# Patient Record
Sex: FEMALE | Race: WHITE | Employment: FULL TIME | URBAN - METROPOLITAN AREA
[De-identification: names, ages, dates, MRNs, and addresses within clinical notes are randomized per-mention and may not be internally consistent; named-entity substitution may affect disease eponyms.]

---

## 2017-01-03 ENCOUNTER — OFFICE VISIT (OUTPATIENT)
Dept: PHYSICAL THERAPY | Facility: HOSPITAL | Age: 47
End: 2017-01-03
Attending: INTERNAL MEDICINE
Payer: COMMERCIAL

## 2017-01-03 PROCEDURE — 97140 MANUAL THERAPY 1/> REGIONS: CPT

## 2017-01-03 PROCEDURE — 97112 NEUROMUSCULAR REEDUCATION: CPT

## 2017-01-03 PROCEDURE — 97110 THERAPEUTIC EXERCISES: CPT

## 2017-01-03 NOTE — PROGRESS NOTES
Dx: Neuropathy : arm; Autonomic dysfunction; POTS; Neck/back pain        Authorized # of Visits:  3/10       Next MD visit: none scheduled  Fall Risk: standard         Precautions: n/a           Medication Changes since last visit?: No    Subjective: pt re impingement symptoms with R shoulder elevation. Shoulder impingement symptoms reduced with proper positioning of shoulder girdle. Goals:   1.  Patient will be independent with HEP to optimize gains made in PT to home and community settings and for self m

## 2017-01-05 ENCOUNTER — APPOINTMENT (OUTPATIENT)
Dept: PHYSICAL THERAPY | Facility: HOSPITAL | Age: 47
End: 2017-01-05
Attending: INTERNAL MEDICINE
Payer: COMMERCIAL

## 2017-01-10 ENCOUNTER — APPOINTMENT (OUTPATIENT)
Dept: PHYSICAL THERAPY | Facility: HOSPITAL | Age: 47
End: 2017-01-10
Attending: INTERNAL MEDICINE
Payer: COMMERCIAL

## 2017-01-12 ENCOUNTER — OFFICE VISIT (OUTPATIENT)
Dept: PHYSICAL THERAPY | Facility: HOSPITAL | Age: 47
End: 2017-01-12
Attending: INTERNAL MEDICINE
Payer: COMMERCIAL

## 2017-01-12 PROCEDURE — 97112 NEUROMUSCULAR REEDUCATION: CPT

## 2017-01-12 PROCEDURE — 97140 MANUAL THERAPY 1/> REGIONS: CPT

## 2017-01-12 PROCEDURE — 97110 THERAPEUTIC EXERCISES: CPT

## 2017-01-12 NOTE — PROGRESS NOTES
Dx: Neuropathy : arm; Autonomic dysfunction; POTS; Neck/back pain        Authorized # of Visits:  4/10       Next MD visit: none scheduled  Fall Risk: standard         Precautions: n/a           Medication Changes since last visit?: No    Subjective: Over shoulders: LAD; lateral distraction; PROM (shoulder flex, abd each dir)  B hips: LAD followed by PROM for flex/IR/ER B shoulders: LAD; lateral distraction; PROM (shoulder flex, abd each dir)  B hips: LAD followed by PROM for flex/IR/ER   Neuro Re-ed Be Act

## 2017-01-17 ENCOUNTER — APPOINTMENT (OUTPATIENT)
Dept: PHYSICAL THERAPY | Facility: HOSPITAL | Age: 47
End: 2017-01-17
Attending: INTERNAL MEDICINE
Payer: COMMERCIAL

## 2017-01-19 ENCOUNTER — APPOINTMENT (OUTPATIENT)
Dept: PHYSICAL THERAPY | Facility: HOSPITAL | Age: 47
End: 2017-01-19
Attending: INTERNAL MEDICINE
Payer: COMMERCIAL

## 2017-01-26 ENCOUNTER — TELEPHONE (OUTPATIENT)
Dept: PHYSICAL THERAPY | Facility: HOSPITAL | Age: 47
End: 2017-01-26

## 2017-01-26 ENCOUNTER — APPOINTMENT (OUTPATIENT)
Dept: PHYSICAL THERAPY | Facility: HOSPITAL | Age: 47
End: 2017-01-26
Attending: INTERNAL MEDICINE
Payer: COMMERCIAL

## 2017-01-26 NOTE — TELEPHONE ENCOUNTER
Pt was on PT scheduled. I called and PT states that she was unaware of this appt. I confirmed next PT session for 1/31/17. She will email me to confirm if she can attend this session. Pt also states that she will receive her ergonomic desk next week.

## 2017-01-31 ENCOUNTER — OFFICE VISIT (OUTPATIENT)
Dept: PHYSICAL THERAPY | Facility: HOSPITAL | Age: 47
End: 2017-01-31
Attending: INTERNAL MEDICINE
Payer: COMMERCIAL

## 2017-01-31 PROCEDURE — 97112 NEUROMUSCULAR REEDUCATION: CPT

## 2017-01-31 PROCEDURE — 97110 THERAPEUTIC EXERCISES: CPT

## 2017-01-31 NOTE — PROGRESS NOTES
Dx: Neuropathy : arm; Autonomic dysfunction; POTS; Neck/back pain        Authorized # of Visits:  5/10       Next MD visit: none scheduled  Fall Risk: standard         Precautions: n/a           Medication Changes since last visit?: No    Subjective: Pt re Serratus punch on foamroll: 10x2  - Supine: snow angels on foam roll: 10x2  - Supine: march with alt arm lift on foamroll: 10x1  - Supine on foam roll: toe taps: 10x1  - Supine on foam roll: marching: 10x1  - Supine: UE neuromobilization: wrist flex/ext wi sleep for at least 6 consecutive hours without disturbance due to symptoms. 5. Patient will be able to return to her cardiac rehab exercises at least 3 times a week without difficulty. Plan: Focus on strengthening.     Charges: TEx2,  Neuro Re-edx1

## 2017-02-07 ENCOUNTER — APPOINTMENT (OUTPATIENT)
Dept: PHYSICAL THERAPY | Facility: HOSPITAL | Age: 47
End: 2017-02-07
Payer: COMMERCIAL

## 2017-02-07 ENCOUNTER — OFFICE VISIT (OUTPATIENT)
Dept: OBGYN CLINIC | Facility: CLINIC | Age: 47
End: 2017-02-07

## 2017-02-07 VITALS — DIASTOLIC BLOOD PRESSURE: 62 MMHG | SYSTOLIC BLOOD PRESSURE: 112 MMHG

## 2017-02-07 DIAGNOSIS — Z30.431 IUD CHECK UP: Primary | ICD-10-CM

## 2017-02-07 PROCEDURE — 99213 OFFICE O/P EST LOW 20 MIN: CPT | Performed by: OBSTETRICS & GYNECOLOGY

## 2017-02-07 RX ORDER — TRETINOIN 0.025 %
CREAM (GRAM) TOPICAL
COMMUNITY
Start: 2016-10-24 | End: 2017-02-07

## 2017-02-07 RX ORDER — DEXTROAMPHETAMINE SACCHARATE, AMPHETAMINE ASPARTATE, DEXTROAMPHETAMINE SULFATE AND AMPHETAMINE SULFATE 5; 5; 5; 5 MG/1; MG/1; MG/1; MG/1
20 TABLET ORAL
COMMUNITY
End: 2017-02-07

## 2017-02-07 RX ORDER — DULOXETIN HYDROCHLORIDE 60 MG/1
120 CAPSULE, DELAYED RELEASE ORAL
COMMUNITY
End: 2017-02-20

## 2017-02-07 RX ORDER — DEXTROAMPHETAMINE SACCHARATE, AMPHETAMINE ASPARTATE MONOHYDRATE, DEXTROAMPHETAMINE SULFATE AND AMPHETAMINE SULFATE 7.5; 7.5; 7.5; 7.5 MG/1; MG/1; MG/1; MG/1
CAPSULE, EXTENDED RELEASE ORAL
Refills: 0 | COMMUNITY
Start: 2017-01-24 | End: 2019-10-03

## 2017-02-07 RX ORDER — HYDROQUINONE 40 MG/G
CREAM TOPICAL
COMMUNITY
Start: 2016-10-24 | End: 2017-02-07

## 2017-02-07 NOTE — PROGRESS NOTES
HPI:    Patient ID: Juliette Moser is a 56year old female. HPI  GYN follow-up visits menorrhagia treated with Mirena placed 3 months ago. Menstrual cycles have been monthly now and lasting 5 days and her lites. No longer heavy bleeding.   No comp Paralysis             Unstable vital signs  Epinephrine                 Comment:Other reaction(s): Other             Unstable heart rate/BP   PHYSICAL EXAM:   Physical Exam  Speculum vaginally. Cervix normal-appearing IUD string visible 2-1/2 cm length.

## 2017-02-14 ENCOUNTER — APPOINTMENT (OUTPATIENT)
Dept: PHYSICAL THERAPY | Facility: HOSPITAL | Age: 47
End: 2017-02-14
Attending: INTERNAL MEDICINE
Payer: COMMERCIAL

## 2017-02-15 ENCOUNTER — TELEPHONE (OUTPATIENT)
Dept: INTERNAL MEDICINE CLINIC | Facility: CLINIC | Age: 47
End: 2017-02-15

## 2017-02-15 NOTE — TELEPHONE ENCOUNTER
Pt stts she does not feel well and that [facility] [place] knows her health issue. Pt decline on giving more information.  Please advise

## 2017-02-16 NOTE — TELEPHONE ENCOUNTER
Patient stated has a rare neurological disorder and needs to speak to Dr. Akshat He about her medications and feelings. She believes the medications are causing her to be depressed. Did not want to speak further.   Was calling to see if she can schedule an a

## 2017-02-20 ENCOUNTER — OFFICE VISIT (OUTPATIENT)
Dept: INTERNAL MEDICINE CLINIC | Facility: CLINIC | Age: 47
End: 2017-02-20

## 2017-02-20 VITALS
RESPIRATION RATE: 16 BRPM | DIASTOLIC BLOOD PRESSURE: 69 MMHG | HEIGHT: 68 IN | SYSTOLIC BLOOD PRESSURE: 147 MMHG | HEART RATE: 109 BPM

## 2017-02-20 DIAGNOSIS — F32.A DEPRESSIVE DISORDER: Primary | ICD-10-CM

## 2017-02-20 DIAGNOSIS — G90.9 AUTONOMIC DYSFUNCTION: ICD-10-CM

## 2017-02-20 PROCEDURE — 99214 OFFICE O/P EST MOD 30 MIN: CPT | Performed by: INTERNAL MEDICINE

## 2017-02-20 PROCEDURE — 99212 OFFICE O/P EST SF 10 MIN: CPT | Performed by: INTERNAL MEDICINE

## 2017-02-20 RX ORDER — BUPROPION HYDROCHLORIDE 150 MG/1
TABLET ORAL
Refills: 3 | COMMUNITY
Start: 2017-02-07 | End: 2017-07-18

## 2017-02-20 NOTE — ASSESSMENT & PLAN NOTE
Seen by dr Elias Forbes been placed on wellbutrin ,in addition to cymbalta and clonazepam- . 5 mgs 3-4 times a day and sees a therapist once a week. Has been struggling to cope with work stressors.   Job description-environmental health specialist.

## 2017-02-20 NOTE — PATIENT INSTRUCTIONS
Problem List Items Addressed This Visit        Unprioritized    Autonomic dysfunction     Diagnosed with POTS about 2 years now,has been seen in multiple centers and has been on fluid and salt resuscitation as well as management of hypotension/dizziness an

## 2017-02-20 NOTE — PROGRESS NOTES
HPI:    Patient ID: Sharath Manzo is a 56year old female. HPI Comments: Has been having difficulty with accomodations from work. Has filed a complained with [facility] and now feels that she is being treated without adequate evaluation of her needs. Oral Cap 1 capsule po bid Disp: 20 capsule Rfl: 0   BuPROPion HCl ER, XL, 150 MG Oral Tablet 24 Hr TK 1 T PO QAM Disp:  Rfl: 3   Cholecalciferol (VITAMIN D) 1000 units Oral Tab Take by mouth.  Disp:  Rfl:    Amphetamine-Dextroamphet ER 30 MG Oral Capsule SR not currently breastfeeding. There is no weight on file to calculate BMI. PHYSICAL EXAM:   Physical Exam   Constitutional: She is oriented to person, place, and time. She appears well-developed and well-nourished.    HENT:   Right Ear: External ear norm management of hypotension/dizziness and temperature control issues. has been able to exercise and keep herself in good condition,is able to walk about 1 mile in cool temps. unable to tolerate the heat.   Has been on physical therapy for the back and neck prob

## 2017-02-20 NOTE — ASSESSMENT & PLAN NOTE
Diagnosed with POTS about 2 years now,has been seen in multiple centers and has been on fluid and salt resuscitation as well as management of hypotension/dizziness and temperature control issues. has been able to exercise and keep herself in good condition,

## 2017-02-21 ENCOUNTER — OFFICE VISIT (OUTPATIENT)
Dept: PHYSICAL THERAPY | Facility: HOSPITAL | Age: 47
End: 2017-02-21
Attending: INTERNAL MEDICINE
Payer: COMMERCIAL

## 2017-02-21 PROCEDURE — 97140 MANUAL THERAPY 1/> REGIONS: CPT

## 2017-02-21 PROCEDURE — 97110 THERAPEUTIC EXERCISES: CPT

## 2017-02-21 PROCEDURE — 97530 THERAPEUTIC ACTIVITIES: CPT

## 2017-02-21 NOTE — PROGRESS NOTES
Dx: Neuropathy : arm; Autonomic dysfunction; POTS; Neck/back pain        Authorized # of Visits:  6/10 - 3/13/2017     Next MD visit: none scheduled  Fall Risk: standard         Precautions: n/a           Medication Changes since last visit?: No    Subject MFR at B radial, ulnar and median nerves   Neuro Re-ed Be Activated technique Diaphragm & psoas  Diaphragmatic breathing  Coordinating breathing with exercises    Therapeutic Activity  X 10 minutes  - Reviewed ergonomics of desk with pictures provided by [facility]

## 2017-02-22 RX ORDER — CLINDAMYCIN HYDROCHLORIDE 300 MG/1
CAPSULE ORAL
Qty: 20 CAPSULE | Refills: 0 | Status: SHIPPED | OUTPATIENT
Start: 2017-02-22 | End: 2017-07-18

## 2017-02-28 ENCOUNTER — TELEPHONE (OUTPATIENT)
Dept: ADMINISTRATIVE | Age: 47
End: 2017-02-28

## 2017-02-28 ENCOUNTER — OFFICE VISIT (OUTPATIENT)
Dept: PHYSICAL THERAPY | Facility: HOSPITAL | Age: 47
End: 2017-02-28
Attending: INTERNAL MEDICINE
Payer: COMMERCIAL

## 2017-02-28 PROCEDURE — 97112 NEUROMUSCULAR REEDUCATION: CPT

## 2017-02-28 PROCEDURE — 97140 MANUAL THERAPY 1/> REGIONS: CPT

## 2017-02-28 PROCEDURE — 97110 THERAPEUTIC EXERCISES: CPT

## 2017-02-28 NOTE — PROGRESS NOTES
Dx: Neuropathy : arm; Autonomic dysfunction; POTS; Neck/back pain        Authorized # of Visits:  6/10 - 3/13/2017     Next MD visit: none scheduled  Fall Risk: standard         Precautions: n/a           Medication Changes since last visit?: No    Subject median n.; X 5 each, B   Manual Therapy B shoulders: LAD; lateral distraction; PROM (shoulder flex, abd each dir) x 5 minutes X 15 minutes  - Supine: cervical A/P mobilization grades II-III; FMP to improve cervical flexion  - Supine: MFR at B radial, ulnar

## 2017-02-28 NOTE — TELEPHONE ENCOUNTER
Dr. Newsome Friend,  Good afternoon,  This patient came into [facility] asking for assistance with a work accommodation. It looks like you provided a very detailed letter about a year ago. Is her diagnosis permanent?    She did inform me her neurologist will not fill th

## 2017-03-01 NOTE — TELEPHONE ENCOUNTER
Discussed-  Diagnosis of POTS -PERMANENT-and is always prone to autonomic dysfunction with lightheadedness and temp fluctuation intolerance as well as depression.   Will need to work at home most times but pt has been stable on current regimen of fluid resu

## 2017-03-09 ENCOUNTER — PATIENT MESSAGE (OUTPATIENT)
Dept: INTERNAL MEDICINE CLINIC | Facility: CLINIC | Age: 47
End: 2017-03-09

## 2017-03-13 NOTE — TELEPHONE ENCOUNTER
From: Olga Sheth  To: Cristiano Aponte MD  Sent: 3/9/2017 3:24 PM CST  Subject: Visit Follow-up Question    Dr Noel Gordon. I dropped off an envelope with [employer]'s medical questions and my thoughts on their questions.  Can you please help me with this letter

## 2017-03-14 ENCOUNTER — OFFICE VISIT (OUTPATIENT)
Dept: PHYSICAL THERAPY | Facility: HOSPITAL | Age: 47
End: 2017-03-14
Attending: INTERNAL MEDICINE
Payer: COMMERCIAL

## 2017-03-14 ENCOUNTER — TELEPHONE (OUTPATIENT)
Dept: INTERNAL MEDICINE CLINIC | Facility: CLINIC | Age: 47
End: 2017-03-14

## 2017-03-14 PROCEDURE — 97110 THERAPEUTIC EXERCISES: CPT

## 2017-03-14 NOTE — PROGRESS NOTES
Dx: Neuropathy : arm; Autonomic dysfunction; POTS; Neck/back pain        Authorized # of Visits:  8/10 - 3/13/2017     Next MD visit: none scheduled  Fall Risk: standard         Precautions: n/a           Medication Changes since last visit?: No     Physic at 5/5 MMT grade, except for C1 (cervical flexors) at 4+/5.  She demonstrates (+) adverse neural tension for median, ulnar and radial nerves; however, has shown steady increase in nuerodynamics with increased ROM noted with ULNTT tests with subsequent PT vi breathing   Manual Therapy X 15 minutes  - Supine: cervical A/P mobilization grades II-III; FMP to improve cervical flexion  - Supine: MFR at B radial, ulnar and median nerves    Neuro Re-ed X 8 minutes  - Diaphragmatic breathing  - Manual cueing for neuro

## 2017-03-14 NOTE — TELEPHONE ENCOUNTER
Pt stts she is going through a tough time and started crying. Pt stts she has a clinical issue.  Please advise

## 2017-03-14 NOTE — TELEPHONE ENCOUNTER
Reason for Call/Chief Complaint: Patient upset in regards to a letter that she needs to have filled out (please see my chart encounters)   Onset:   Nursing Assessment/Associated Symptoms: Patient is calling upset in regards to needing a letter filled out s

## 2017-03-15 NOTE — TELEPHONE ENCOUNTER
Dr. Yuly Beebe,     Pt called back stating that she needs to speak to you by phone or in an office visit. She was crying during our phone conversation due to the need for her letter in regard to work.  I noted many communications in her medical record in regard

## 2017-03-16 NOTE — TELEPHONE ENCOUNTER
Pt is calling back and is very upset ,still waiting for the DR to call her . Patient spoke to nurse yesterday ,  and said she was crying and needs to speak to her DR or anyone that can help her ,  [phone]. Pt is crying on the phone.

## 2017-03-17 NOTE — TELEPHONE ENCOUNTER
Dr Mercedes Gillis, please advise. Pt says that she needs to speak with ARMANDO she would like to be called at [phone]. Pt also asking if ARMANDO will read her [employer] messages before calling. Pt is very concerned about losing her job.   She says her agency's budget

## 2017-03-17 NOTE — TELEPHONE ENCOUNTER
Pt continues to call demanding to speak to ARMANDO. Wants to make sure that ARMANDO reads [employer] messages before calling.

## 2017-03-28 ENCOUNTER — APPOINTMENT (OUTPATIENT)
Dept: PHYSICAL THERAPY | Facility: HOSPITAL | Age: 47
End: 2017-03-28
Payer: COMMERCIAL

## 2017-04-04 ENCOUNTER — TELEPHONE (OUTPATIENT)
Dept: INTERNAL MEDICINE CLINIC | Facility: CLINIC | Age: 47
End: 2017-04-04

## 2017-04-06 NOTE — TELEPHONE ENCOUNTER
PA for Tretinoin 0.025% cream completed with [facility] via CMM response time 24-72 hours KEY [id number].

## 2017-04-11 ENCOUNTER — APPOINTMENT (OUTPATIENT)
Dept: PHYSICAL THERAPY | Facility: HOSPITAL | Age: 47
End: 2017-04-11
Payer: COMMERCIAL

## 2017-04-25 ENCOUNTER — APPOINTMENT (OUTPATIENT)
Dept: PHYSICAL THERAPY | Facility: HOSPITAL | Age: 47
End: 2017-04-25
Payer: COMMERCIAL

## 2017-07-13 ENCOUNTER — PATIENT MESSAGE (OUTPATIENT)
Dept: INTERNAL MEDICINE CLINIC | Facility: CLINIC | Age: 47
End: 2017-07-13

## 2017-07-16 ENCOUNTER — TELEPHONE (OUTPATIENT)
Dept: INTERNAL MEDICINE CLINIC | Facility: CLINIC | Age: 47
End: 2017-07-16

## 2017-07-16 NOTE — TELEPHONE ENCOUNTER
To: Siva Letters      From: Amie Roth RN      Created: 7/16/2017 3:35 PM        Hi Judi Lucas, Please call the office Monday am and ask to speak to a nurse so we can assist with getting you in with Dr. Lianet Meehan.      Patrice Boswell RN  [phone]

## 2017-07-16 NOTE — TELEPHONE ENCOUNTER
From: Angelina Gonzales  To: Farhan Jesus MD  Sent: 7/13/2017 12:24 PM CDT  Subject: Visit Follow-up Question    Hi Dr Festus Spence. I need to see you. I'm struggling with the heat and extreme fatigue.  Can you please see if you can make time in your schedule

## 2017-07-17 NOTE — TELEPHONE ENCOUNTER
Spoke to patient, she states she would like to see Dr Lianet Meehan - states \"she feels off\" and Dr Lianet Meehan would know what that means. Pt states she has felt off for 2-3 weeks, worsening in the last few days.  States one week ago she woke up and was so dehyd

## 2017-07-18 ENCOUNTER — OFFICE VISIT (OUTPATIENT)
Dept: INTERNAL MEDICINE CLINIC | Facility: CLINIC | Age: 47
End: 2017-07-18

## 2017-07-18 VITALS
SYSTOLIC BLOOD PRESSURE: 122 MMHG | BODY MASS INDEX: 22.73 KG/M2 | HEART RATE: 99 BPM | DIASTOLIC BLOOD PRESSURE: 79 MMHG | HEIGHT: 68 IN | WEIGHT: 150 LBS

## 2017-07-18 DIAGNOSIS — R53.82 CHRONIC FATIGUE: ICD-10-CM

## 2017-07-18 DIAGNOSIS — L70.5 EXCORIATED ACNE: ICD-10-CM

## 2017-07-18 DIAGNOSIS — I49.8 POTS (POSTURAL ORTHOSTATIC TACHYCARDIA SYNDROME): Primary | ICD-10-CM

## 2017-07-18 PROCEDURE — 99214 OFFICE O/P EST MOD 30 MIN: CPT | Performed by: INTERNAL MEDICINE

## 2017-07-18 PROCEDURE — 99212 OFFICE O/P EST SF 10 MIN: CPT | Performed by: INTERNAL MEDICINE

## 2017-07-18 RX ORDER — CHLORHEXIDINE GLUCONATE 4 G/100ML
30 SOLUTION TOPICAL
Qty: 473 ML | Refills: 0 | Status: SHIPPED | OUTPATIENT
Start: 2017-07-18 | End: 2018-02-08

## 2017-07-18 RX ORDER — CLINDAMYCIN AND BENZOYL PEROXIDE 10; 50 MG/G; MG/G
GEL TOPICAL DAILY
COMMUNITY
End: 2021-01-01

## 2017-07-18 RX ORDER — B3/AZEL/QUER/TUR/FA/B6/ZN/COPP 700 MG-500
1 TABLET ORAL 2 TIMES DAILY
COMMUNITY
End: 2019-10-03

## 2017-07-18 RX ORDER — DOXYCYCLINE HYCLATE 100 MG/1
100 CAPSULE ORAL 2 TIMES DAILY
COMMUNITY
End: 2017-10-26 | Stop reason: ALTCHOICE

## 2017-07-18 RX ORDER — FLUCONAZOLE 150 MG/1
150 TABLET ORAL ONCE
COMMUNITY
End: 2017-10-26

## 2017-07-18 RX ORDER — BUPROPION HYDROCHLORIDE 300 MG/1
300 TABLET ORAL DAILY
COMMUNITY
End: 2021-01-01

## 2017-07-19 NOTE — ASSESSMENT & PLAN NOTE
Gradually worsening fatigue, no recent fevers or chills. No focus of infection. Mild redness in the throat without any postnasal drip that is obvious. No sinus infection. Patient has been on doxycycline for management of worsening acne.   Advised to dri

## 2017-07-19 NOTE — PATIENT INSTRUCTIONS
Problem List Items Addressed This Visit        Unprioritized    Chronic fatigue     Gradually worsening fatigue, no recent fevers or chills. No focus of infection. Mild redness in the throat without any postnasal drip that is obvious.   No sinus infection Yes

## 2017-07-19 NOTE — PROGRESS NOTES
HPI:    Patient ID: Dannielle Mistry is a 56year old female. Per dr Philomena Grier from [address] [place] [address]    IMP: Symptoms have clearly improved with non--pharmacological interventions.  She has been dealing with numerous major stressors in her life includin Positive for fatigue. HENT: Negative. Eyes: Negative. Respiratory: Negative. Cardiovascular: Negative. Gastrointestinal: Negative. Endocrine: Negative. Genitourinary: Negative. Musculoskeletal: Positive for arthralgias and myalgias. OR) Take  by mouth. Disp:  Rfl:    ClonazePAM (KLONOPIN) 0.5 MG Oral Tab Take 0.5 mg by mouth 2 (two) times daily as needed for Anxiety (and a third pill as needed).  Disp:  Rfl:    Elastic Bandages & Supports (TRUFORM STOCKINGS 10-20MMHG) Does not apply Mi Musculoskeletal: Normal range of motion. She exhibits no edema or tenderness. Lymphadenopathy:     She has no cervical adenopathy. Neurological: She is alert and oriented to person, place, and time. She has normal reflexes. No cranial nerve deficit. (postural orthostatic tachycardia syndrome) - Primary      Other Visit Diagnoses    None. Return in about 2 weeks (around 8/1/2017).   PT UNDERSTANDS AND AGREES TO FOLLOW DIRECTIONS AND ADVICE      Orders Placed This Encounter      CBC W Differential

## 2017-08-01 ENCOUNTER — TELEPHONE (OUTPATIENT)
Dept: INTERNAL MEDICINE CLINIC | Facility: CLINIC | Age: 47
End: 2017-08-01

## 2017-08-01 NOTE — TELEPHONE ENCOUNTER
Patient calling to cancel today's office visit. Pt states she has been under a lot of stress lately from work and feels particularly bad today.  Pt states she slept in until 10 which is very unlike her, heart rate and blood pressure more elevated than usual

## 2017-08-03 NOTE — TELEPHONE ENCOUNTER
Miles Blackwell I called pt to  Find out how she is dong. She stated that she feel much better but she still feels a little fatigue. She stated that she will have her blood work done tomorrow and will see you next week.  I gave her a appt to see you on 8/9 at

## 2017-08-04 ENCOUNTER — TELEPHONE (OUTPATIENT)
Dept: OBGYN CLINIC | Facility: CLINIC | Age: 47
End: 2017-08-04

## 2017-08-04 NOTE — TELEPHONE ENCOUNTER
Pt wants IUD removed because the IUD is causing her to have acne and her menstrual periods are 10-14 days in length. Pt will be on her menses next week. Pt scheduled for 8/15 at 2:50 pm for iud removal and to discuss other BC options.

## 2017-08-07 ENCOUNTER — LAB ENCOUNTER (OUTPATIENT)
Dept: LAB | Facility: HOSPITAL | Age: 47
End: 2017-08-07
Attending: INTERNAL MEDICINE
Payer: COMMERCIAL

## 2017-08-07 DIAGNOSIS — R53.82 CHRONIC FATIGUE: ICD-10-CM

## 2017-08-07 LAB
ALBUMIN SERPL BCP-MCNC: 4.4 G/DL (ref 3.5–4.8)
ALBUMIN/GLOB SERPL: 1.6 {RATIO} (ref 1–2)
ALP SERPL-CCNC: 32 U/L (ref 32–100)
ALT SERPL-CCNC: 26 U/L (ref 14–54)
ANION GAP SERPL CALC-SCNC: 6 MMOL/L (ref 0–18)
AST SERPL-CCNC: 23 U/L (ref 15–41)
BASOPHILS # BLD: 0 K/UL (ref 0–0.2)
BASOPHILS NFR BLD: 0 %
BILIRUB SERPL-MCNC: 0.6 MG/DL (ref 0.3–1.2)
BUN SERPL-MCNC: 13 MG/DL (ref 8–20)
BUN/CREAT SERPL: 12.5 (ref 10–20)
CALCIUM SERPL-MCNC: 9.3 MG/DL (ref 8.5–10.5)
CHLORIDE SERPL-SCNC: 104 MMOL/L (ref 95–110)
CO2 SERPL-SCNC: 28 MMOL/L (ref 22–32)
CREAT SERPL-MCNC: 1.04 MG/DL (ref 0.5–1.5)
CRP SERPL-MCNC: <0.5 MG/DL (ref 0–0.9)
EOSINOPHIL # BLD: 0.1 K/UL (ref 0–0.7)
EOSINOPHIL NFR BLD: 2 %
ERYTHROCYTE [DISTWIDTH] IN BLOOD BY AUTOMATED COUNT: 12.8 % (ref 11–15)
ERYTHROCYTE [SEDIMENTATION RATE] IN BLOOD: 5 MM/HR (ref 0–20)
GLOBULIN PLAS-MCNC: 2.7 G/DL (ref 2.5–3.7)
GLUCOSE SERPL-MCNC: 109 MG/DL (ref 70–99)
HCT VFR BLD AUTO: 44.6 % (ref 35–48)
HGB BLD-MCNC: 15.4 G/DL (ref 12–16)
LYMPHOCYTES # BLD: 1.7 K/UL (ref 1–4)
LYMPHOCYTES NFR BLD: 27 %
MCH RBC QN AUTO: 32.2 PG (ref 27–32)
MCHC RBC AUTO-ENTMCNC: 34.4 G/DL (ref 32–37)
MCV RBC AUTO: 93.4 FL (ref 80–100)
MONOCYTES # BLD: 0.4 K/UL (ref 0–1)
MONOCYTES NFR BLD: 6 %
NEUTROPHILS # BLD AUTO: 4 K/UL (ref 1.8–7.7)
NEUTROPHILS NFR BLD: 65 %
OSMOLALITY UR CALC.SUM OF ELEC: 287 MOSM/KG (ref 275–295)
PLATELET # BLD AUTO: 191 K/UL (ref 140–400)
PMV BLD AUTO: 9.6 FL (ref 7.4–10.3)
POTASSIUM SERPL-SCNC: 4.7 MMOL/L (ref 3.3–5.1)
PROT SERPL-MCNC: 7.1 G/DL (ref 5.9–8.4)
RBC # BLD AUTO: 4.78 M/UL (ref 3.7–5.4)
SODIUM SERPL-SCNC: 138 MMOL/L (ref 136–144)
T3FREE SERPL-MCNC: 3.15 PG/ML (ref 2.53–4.29)
T4 FREE SERPL-MCNC: 0.87 NG/DL (ref 0.58–1.64)
TSH SERPL-ACNC: 1.44 UIU/ML (ref 0.45–5.33)
VIT B12 SERPL-MCNC: 897 PG/ML (ref 181–914)
WBC # BLD AUTO: 6.2 K/UL (ref 4–11)

## 2017-08-07 PROCEDURE — 85025 COMPLETE CBC W/AUTO DIFF WBC: CPT

## 2017-08-07 PROCEDURE — 82607 VITAMIN B-12: CPT

## 2017-08-07 PROCEDURE — 80053 COMPREHEN METABOLIC PANEL: CPT

## 2017-08-07 PROCEDURE — 82306 VITAMIN D 25 HYDROXY: CPT

## 2017-08-07 PROCEDURE — 84439 ASSAY OF FREE THYROXINE: CPT

## 2017-08-07 PROCEDURE — 84481 FREE ASSAY (FT-3): CPT

## 2017-08-07 PROCEDURE — 84443 ASSAY THYROID STIM HORMONE: CPT

## 2017-08-07 PROCEDURE — 85652 RBC SED RATE AUTOMATED: CPT

## 2017-08-07 PROCEDURE — 86140 C-REACTIVE PROTEIN: CPT

## 2017-08-07 PROCEDURE — 36415 COLL VENOUS BLD VENIPUNCTURE: CPT

## 2017-08-08 LAB — 25(OH)D3 SERPL-MCNC: 45.9 NG/ML

## 2017-08-09 ENCOUNTER — OFFICE VISIT (OUTPATIENT)
Dept: INTERNAL MEDICINE CLINIC | Facility: CLINIC | Age: 47
End: 2017-08-09

## 2017-08-09 VITALS
RESPIRATION RATE: 18 BRPM | WEIGHT: 150.13 LBS | HEART RATE: 123 BPM | BODY MASS INDEX: 22.75 KG/M2 | SYSTOLIC BLOOD PRESSURE: 146 MMHG | DIASTOLIC BLOOD PRESSURE: 75 MMHG | OXYGEN SATURATION: 98 % | TEMPERATURE: 99 F | HEIGHT: 68 IN

## 2017-08-09 DIAGNOSIS — R53.82 CHRONIC FATIGUE: ICD-10-CM

## 2017-08-09 DIAGNOSIS — I49.8 POTS (POSTURAL ORTHOSTATIC TACHYCARDIA SYNDROME): ICD-10-CM

## 2017-08-09 DIAGNOSIS — N92.0 MENORRHAGIA WITH REGULAR CYCLE: Primary | ICD-10-CM

## 2017-08-09 PROCEDURE — 99212 OFFICE O/P EST SF 10 MIN: CPT | Performed by: INTERNAL MEDICINE

## 2017-08-09 PROCEDURE — 99214 OFFICE O/P EST MOD 30 MIN: CPT | Performed by: INTERNAL MEDICINE

## 2017-08-09 NOTE — PATIENT INSTRUCTIONS
Problem List Items Addressed This Visit        Unprioritized    Chronic fatigue     Chronic fatigue most likely related to pots, volume deficits, depressive disorder. No significant abnormalities and labs at this time.   Advised to continue to drink fluids

## 2017-08-09 NOTE — ASSESSMENT & PLAN NOTE
Intermittent and irregular bleeding that continues throughout the cycle. She does have an IUD in place which seems to have worsened her acne in addition. Planning to have this taken out an alternate form of birth control at that time.   Will follow-up aft

## 2017-08-09 NOTE — PROGRESS NOTES
HPI:    Patient ID: Braulio Clemente is a 56year old female. All labs completed look normal.        Tired   This is a chronic problem. The current episode started more than 1 year ago. The problem occurs constantly.  Progression since onset: multifac Hr TK ONE C PO QAM Disp:  Rfl: 0   hydrocortisone 2.5 % Rectal Cream Apply as directed Disp: 30 g Rfl: 2   Fluticasone Propionate 50 MCG/ACT Nasal Suspension 1 PUFF EACH NOSTRIL 2 TIMES A  DAY Disp: 1 Bottle Rfl: 3   tretinoin 0.025 % External Cream Extern EOM are normal. Pupils are equal, round, and reactive to light. Right eye exhibits no discharge. Left eye exhibits no discharge. Neck: Normal range of motion. Neck supple. No JVD present. No thyromegaly present.    Cardiovascular: Normal rate, regular rhy fluctuations, temperature regulation issues, lightheadedness and presyncope. Supportive treatment has been discussed. Adequate fluid for hydration discussed. She has been seen by psychiatry as well as counseling for management of depression.   Will monit

## 2017-08-09 NOTE — ASSESSMENT & PLAN NOTE
Chronic fatigue most likely related to pots, volume deficits, depressive disorder. No significant abnormalities and labs at this time. Advised to continue to drink fluids, exercise on a regular basis eat frequently.   Discussed with [facility]–Dr. POSEY

## 2017-08-09 NOTE — ASSESSMENT & PLAN NOTE
Ongoing issues with [place] related hypotension, mood fluctuations, temperature regulation issues, lightheadedness and presyncope. Supportive treatment has been discussed. Adequate fluid for hydration discussed.   She has been seen by psychiatry as well as

## 2017-08-10 ENCOUNTER — TELEPHONE (OUTPATIENT)
Dept: INTERNAL MEDICINE CLINIC | Facility: CLINIC | Age: 47
End: 2017-08-10

## 2017-08-10 ENCOUNTER — PATIENT MESSAGE (OUTPATIENT)
Dept: INTERNAL MEDICINE CLINIC | Facility: CLINIC | Age: 47
End: 2017-08-10

## 2017-08-15 ENCOUNTER — OFFICE VISIT (OUTPATIENT)
Dept: OBGYN CLINIC | Facility: CLINIC | Age: 47
End: 2017-08-15

## 2017-08-15 ENCOUNTER — TELEPHONE (OUTPATIENT)
Dept: INTERNAL MEDICINE CLINIC | Facility: CLINIC | Age: 47
End: 2017-08-15

## 2017-08-15 VITALS
BODY MASS INDEX: 23 KG/M2 | WEIGHT: 148.63 LBS | DIASTOLIC BLOOD PRESSURE: 80 MMHG | SYSTOLIC BLOOD PRESSURE: 113 MMHG | HEART RATE: 116 BPM

## 2017-08-15 DIAGNOSIS — Z30.432 ENCOUNTER FOR IUD REMOVAL: Primary | ICD-10-CM

## 2017-08-15 LAB
CONTROL LINE PRESENT WITH A CLEAR BACKGROUND (YES/NO): YES YES/NO
KIT LOT #: NORMAL NUMERIC
PREGNANCY TEST, URINE: NEGATIVE

## 2017-08-15 PROCEDURE — 81025 URINE PREGNANCY TEST: CPT | Performed by: OBSTETRICS & GYNECOLOGY

## 2017-08-15 PROCEDURE — 58301 REMOVE INTRAUTERINE DEVICE: CPT | Performed by: OBSTETRICS & GYNECOLOGY

## 2017-08-15 PROCEDURE — 99213 OFFICE O/P EST LOW 20 MIN: CPT | Performed by: OBSTETRICS & GYNECOLOGY

## 2017-08-15 RX ORDER — NORGESTIMATE AND ETHINYL ESTRADIOL 0.25-0.035
1 KIT ORAL DAILY
Qty: 1 PACKAGE | Refills: 2 | Status: SHIPPED | OUTPATIENT
Start: 2017-08-15 | End: 2017-11-04

## 2017-08-15 NOTE — TELEPHONE ENCOUNTER
Dr Yuly Beebe, please advise. Pt says she just had some blood work released on Stewart. She has questions about her glucose level. It is slightly elevated. Pt says this is a trend that has happened over the last several times she had labs.   Pt wants ARMANDO to r

## 2017-08-15 NOTE — TELEPHONE ENCOUNTER
From: Laurie Platt  To: Roc Ivy MD  Sent: 8/10/2017 12:35 PM CDT  Subject: Test Results Question    Can you please ask Dr. Camacho Gardner to finalize my bloodwork results? We went over the results yesterday, during my follow-up visit.  Thank you, Iraida Bradford

## 2017-08-15 NOTE — PROGRESS NOTES
HPI:    Patient ID: Janie Torres is a 56year old female. HPI  GYN consultation  Requesting IUD removal of Mirena. States menses have been lasting \"too long\" gets menstrual cycles regularly every month and last up to 10 days.   Says they are no amphetamine-dextroamphetamine 10 MG Oral Tab Take 10 mg by mouth daily. Disp:  Rfl: 0   DULoxetine HCl 60 MG Oral Cap DR Particles Take 120 mg by mouth daily. Disp:  Rfl: 0   Cyclobenzaprine HCl 10 MG Oral Tab Take 10 mg by mouth as needed.    Disp: Paternal Aunt      Lung cancer (smoker); per NG   • Cancer Paternal Aunt      Lung cancer (smoker); per NG   • Cancer Paternal Uncle      Lung cancer (Non-smoker); per NG      Social History: Smoking status: Never Smoker

## 2017-08-15 NOTE — PROCEDURES
In lithotomy position, speculum placed vaginally and cervix exposed. Cervix cleaned. IUD string grasped with ring forceps and Mirena iud removed intact. Tolerated well.

## 2017-08-30 ENCOUNTER — TELEPHONE (OUTPATIENT)
Dept: OTHER | Age: 47
End: 2017-08-30

## 2017-08-30 NOTE — TELEPHONE ENCOUNTER
Please advise. Thank you. Pt calling with questions/concerns about a couple of her results. Pt states she called a couple of weeks ago, bur no encounter was found. Pt states that her glucose is 109 and her GFR is 57.  Pt states that since she has a ra

## 2017-08-31 NOTE — TELEPHONE ENCOUNTER
She was seen has an office visit. The blood test done nonfasting there is no need to check any hemoglobin A1c at this time. We will recheck that if that remains a concern later on.   What she has is - pots- and it has nothing to do with her blood sugars b

## 2017-09-01 NOTE — TELEPHONE ENCOUNTER
Pt returned call. Reviewed doctor's test results information as noted below. Pt also was asking about reason for lower GFR. Pt states doctor was going to write a letter for her employer requesting reasonable accommodations be made for pt.     Please advi

## 2017-10-09 ENCOUNTER — TELEPHONE (OUTPATIENT)
Dept: INTERNAL MEDICINE CLINIC | Facility: CLINIC | Age: 47
End: 2017-10-09

## 2017-10-09 NOTE — TELEPHONE ENCOUNTER
Please see pt's [id number] message. When asking pt for more information on what type of letter is needed pt got upset. \" Also, please ask her to complete my letter for work. \"

## 2017-10-09 NOTE — TELEPHONE ENCOUNTER
Please see pt's mychart message:    Please forward my request to  Dr Clyde Mariee. I have a rare disorder and she wanted to see me much, much earlier      Pt is scheduled for an px on 12/8, first available appointment.      Pt is asking to be seen sooner, can pt

## 2017-10-23 ENCOUNTER — HOSPITAL ENCOUNTER (OUTPATIENT)
Age: 47
Discharge: HOME | End: 2017-10-23
Payer: COMMERCIAL

## 2017-10-23 VITALS
WEIGHT: 148 LBS | SYSTOLIC BLOOD PRESSURE: 126 MMHG | OXYGEN SATURATION: 100 % | BODY MASS INDEX: 22.43 KG/M2 | HEART RATE: 112 BPM | RESPIRATION RATE: 18 BRPM | HEIGHT: 68 IN | TEMPERATURE: 98 F | DIASTOLIC BLOOD PRESSURE: 86 MMHG

## 2017-10-23 DIAGNOSIS — R11.0 NAUSEA: Primary | ICD-10-CM

## 2017-10-23 DIAGNOSIS — H57.89 IRRITATION OF RIGHT EYE: ICD-10-CM

## 2017-10-23 PROCEDURE — 99214 OFFICE O/P EST MOD 30 MIN: CPT

## 2017-10-23 PROCEDURE — 99213 OFFICE O/P EST LOW 20 MIN: CPT

## 2017-10-23 RX ORDER — ONDANSETRON 4 MG/1
4 TABLET, ORALLY DISINTEGRATING ORAL EVERY 4 HOURS PRN
Qty: 20 TABLET | Refills: 0 | Status: SHIPPED | OUTPATIENT
Start: 2017-10-23 | End: 2017-10-26

## 2017-10-23 RX ORDER — ONDANSETRON 4 MG/1
4 TABLET, ORALLY DISINTEGRATING ORAL EVERY 4 HOURS PRN
Qty: 20 TABLET | Refills: 0 | Status: SHIPPED | OUTPATIENT
Start: 2017-10-23 | End: 2017-10-23

## 2017-10-23 RX ORDER — PYRIDOSTIGMINE BROMIDE 60 MG/1
60 TABLET ORAL 3 TIMES DAILY
COMMUNITY
End: 2019-02-21

## 2017-10-23 NOTE — ED PROVIDER NOTES
Patient Seen in: [facility]    History   Patient presents with:   Eye Visual Problem (opthalmic)  Nausea    Stated Complaint: righ eye pain/nausea    HPI    Patient is a 67-year-old female with a significant medical histor Smokeless tobacco: Never Used                      Alcohol use: Yes           0.0 oz/week     Comment: 1-2 beers/week      Review of Systems   Constitutional: Negative. HENT: Negative.     Eyes: Positive for pa Cardiovascular: Normal rate, regular rhythm, normal heart sounds and intact distal pulses.     Pulmonary/Chest: Breath sounds normal.           ED Course   Labs Reviewed - No data to display    MDM   Patient presented with mild tachycardia for which she s

## 2017-10-23 NOTE — ED INITIAL ASSESSMENT (HPI)
REPORTS RIGHT EYE PAIN X 2 DAYS, STATES SHE ALSO HAS A FOREIGN BODY SENSATION TO HER RIGHT EYE. PATIENT REPORTS A HISTORY OF NERVOUS SYSTEM DISORDER THAT IS CAUSING HER NAUSEA.   PATIENT TAKING OTC NAUZENE, REQUESTING PRESCRIPTION OTC MEDICATION

## 2017-10-24 NOTE — TELEPHONE ENCOUNTER
[address] [place] [address] pt calling us for a update to her message below. She stated that she needs to see you soon this week. She stated that she has a rare disorder and you are aware of.  She will see you on Thursday at 7 pm but she also wanted me to inform you that she

## 2017-10-26 ENCOUNTER — OFFICE VISIT (OUTPATIENT)
Dept: INTERNAL MEDICINE CLINIC | Facility: CLINIC | Age: 47
End: 2017-10-26

## 2017-10-26 VITALS
SYSTOLIC BLOOD PRESSURE: 116 MMHG | HEIGHT: 68 IN | TEMPERATURE: 99 F | BODY MASS INDEX: 22.76 KG/M2 | RESPIRATION RATE: 18 BRPM | HEART RATE: 98 BPM | WEIGHT: 150.19 LBS | DIASTOLIC BLOOD PRESSURE: 78 MMHG

## 2017-10-26 DIAGNOSIS — E78.2 MIXED HYPERLIPIDEMIA: Primary | ICD-10-CM

## 2017-10-26 DIAGNOSIS — G90.9 AUTONOMIC DYSFUNCTION: ICD-10-CM

## 2017-10-26 DIAGNOSIS — F98.8 ATTENTION DEFICIT DISORDER, UNSPECIFIED HYPERACTIVITY PRESENCE: ICD-10-CM

## 2017-10-26 DIAGNOSIS — F32.A DEPRESSIVE DISORDER: ICD-10-CM

## 2017-10-26 PROCEDURE — 90686 IIV4 VACC NO PRSV 0.5 ML IM: CPT | Performed by: INTERNAL MEDICINE

## 2017-10-26 PROCEDURE — 99212 OFFICE O/P EST SF 10 MIN: CPT | Performed by: INTERNAL MEDICINE

## 2017-10-26 PROCEDURE — 99214 OFFICE O/P EST MOD 30 MIN: CPT | Performed by: INTERNAL MEDICINE

## 2017-10-26 PROCEDURE — 90471 IMMUNIZATION ADMIN: CPT | Performed by: INTERNAL MEDICINE

## 2017-10-26 RX ORDER — NORGESTIMATE AND ETHINYL ESTRADIOL 0.25-0.035
KIT ORAL
COMMUNITY
Start: 2017-09-05 | End: 2017-10-26

## 2017-10-26 RX ORDER — TRETINOIN 0.025 %
CREAM (GRAM) TOPICAL
Qty: 45 G | Refills: 6 | Status: SHIPPED | OUTPATIENT
Start: 2017-10-26 | End: 2019-10-03

## 2017-10-26 RX ORDER — BUPROPION HYDROCHLORIDE 300 MG/1
300 TABLET ORAL
COMMUNITY
Start: 2017-09-05 | End: 2017-10-26

## 2017-10-26 RX ORDER — CLINDAMYCIN AND BENZOYL PEROXIDE 10; 50 MG/G; MG/G
GEL TOPICAL
COMMUNITY
Start: 2017-08-03 | End: 2017-10-26

## 2017-10-26 RX ORDER — FLUCONAZOLE 150 MG/1
150 TABLET ORAL ONCE
Qty: 3 TABLET | Refills: 0 | Status: SHIPPED | OUTPATIENT
Start: 2017-10-26 | End: 2018-03-29

## 2017-10-26 RX ORDER — FLUTICASONE PROPIONATE 50 MCG
SPRAY, SUSPENSION (ML) NASAL
COMMUNITY
Start: 2017-06-07 | End: 2017-10-26

## 2017-10-26 RX ORDER — FLUCONAZOLE 150 MG/1
TABLET ORAL
COMMUNITY
Start: 2017-08-30 | End: 2017-10-26

## 2017-10-26 RX ORDER — SODIUM FLUORIDE 5 MG/G
GEL, DENTIFRICE DENTAL
COMMUNITY
Start: 2017-05-20 | End: 2018-02-21

## 2017-10-26 RX ORDER — PYRIDOSTIGMINE BROMIDE 60 MG/1
60 TABLET ORAL
COMMUNITY
Start: 2017-09-12 | End: 2017-10-26

## 2017-10-26 RX ORDER — ONDANSETRON 4 MG/1
4 TABLET, ORALLY DISINTEGRATING ORAL EVERY 4 HOURS PRN
Qty: 30 TABLET | Refills: 2 | Status: SHIPPED | OUTPATIENT
Start: 2017-10-26 | End: 2018-08-08

## 2017-11-06 RX ORDER — NORGESTIMATE AND ETHINYL ESTRADIOL 0.25-0.035
1 KIT ORAL DAILY
Qty: 28 TABLET | Refills: 0 | Status: SHIPPED | OUTPATIENT
Start: 2017-11-06 | End: 2017-11-30

## 2017-11-06 NOTE — PROGRESS NOTES
HPI:    Patient ID: Vanessa Blood is a 56year old female. Has been having difficulty with accomodations from work. Has been very sad,feels excluded from company decisions. Is trying very hard to deal with emotions.     Has had trouble with sta and acne-has been seen by psych and dermatology with continued issues. Associated symptoms include arthralgias, fatigue, myalgias, numbness and weakness. Pertinent negatives include no anorexia. The symptoms are aggravated by stress.  She has tried rest, ly topically daily. Disp:  Rfl:    Diclofenac 5% in Liposome cream APPLY TO FACE EVERY MORNING Disp:  Rfl: 6   Chlorhexidine Gluconate 4 % External Liquid Apply 30 mL topically daily as needed.  Disp: 473 mL Rfl: 0   Cholecalciferol (VITAMIN D) 1000 units Oral 98.6 °F (37 °C)     Body mass index is 22.84 kg/m². PHYSICAL EXAM:   Physical Exam   Constitutional: She is oriented to person, place, and time. She appears well-developed and well-nourished.    HENT:   Right Ear: External ear normal.   Left Ear: Externa been on fluid and salt resuscitation as well as management of hypotension/dizziness and temperature control issues. has been able to exercise and keep herself in good condition,is able to walk about 1 mile in cool temps. unable to tolerate the heat.   Has bee mouth one time.            Imaging & Referrals:  FLULAVAL INFLUENZA VACCINE QUAD PRESERVATIVE FREE 0.5 ML       XE#[id number]

## 2017-11-06 NOTE — ASSESSMENT & PLAN NOTE
Long-standing disorder and has been stable on Adderall. Doses have been cut back recently.   This has been managed entirely per psychiatry

## 2017-11-06 NOTE — ASSESSMENT & PLAN NOTE
Seen by dr Daya Macias been placed on wellbutrin ,in addition to cymbalta and clonazepam- . 5 mgs 3-4 times a day and sees a therapist once a week. Has been struggling to cope with work stressors.   Job description-environmental health specialist.  Work ac

## 2017-11-30 RX ORDER — NORGESTIMATE AND ETHINYL ESTRADIOL 0.25-0.035
1 KIT ORAL DAILY
Qty: 28 TABLET | Refills: 9 | Status: SHIPPED | OUTPATIENT
Start: 2017-11-30 | End: 2018-08-08

## 2017-12-08 ENCOUNTER — OFFICE VISIT (OUTPATIENT)
Dept: INTERNAL MEDICINE CLINIC | Facility: CLINIC | Age: 47
End: 2017-12-08

## 2017-12-08 VITALS
RESPIRATION RATE: 16 BRPM | WEIGHT: 148 LBS | DIASTOLIC BLOOD PRESSURE: 84 MMHG | SYSTOLIC BLOOD PRESSURE: 120 MMHG | HEIGHT: 68 IN | HEART RATE: 128 BPM | BODY MASS INDEX: 22.43 KG/M2

## 2017-12-08 DIAGNOSIS — Z01.419 PAPANICOLAOU TEST, AS PART OF ROUTINE GYNECOLOGICAL EXAMINATION: ICD-10-CM

## 2017-12-08 DIAGNOSIS — E78.2 MIXED HYPERLIPIDEMIA: Primary | ICD-10-CM

## 2017-12-08 DIAGNOSIS — R00.2 PALPITATIONS: ICD-10-CM

## 2017-12-08 DIAGNOSIS — Z00.00 ROUTINE GENERAL MEDICAL EXAMINATION AT A HEALTH CARE FACILITY: ICD-10-CM

## 2017-12-08 DIAGNOSIS — I49.8 POTS (POSTURAL ORTHOSTATIC TACHYCARDIA SYNDROME): ICD-10-CM

## 2017-12-08 DIAGNOSIS — Z12.31 VISIT FOR SCREENING MAMMOGRAM: ICD-10-CM

## 2017-12-08 DIAGNOSIS — F33.1 MODERATE EPISODE OF RECURRENT MAJOR DEPRESSIVE DISORDER (HCC): ICD-10-CM

## 2017-12-08 DIAGNOSIS — Z11.3 SCREENING FOR STD (SEXUALLY TRANSMITTED DISEASE): ICD-10-CM

## 2017-12-08 PROCEDURE — 99396 PREV VISIT EST AGE 40-64: CPT | Performed by: INTERNAL MEDICINE

## 2017-12-08 NOTE — ASSESSMENT & PLAN NOTE
Increasing palpitations -off and on with associated nausea. Pots with tachycardia and orthostasis in the past but the symptoms seem out of nature. 48 hour Holter will be requested. May need an event monitor if this is not captured.

## 2017-12-08 NOTE — PATIENT INSTRUCTIONS
Problem List Items Addressed This Visit        Unprioritized    Hyperlipidemia - Primary     Has been stable diet control alone. Recheck labs have been ordered.          Moderate episode of recurrent major depressive disorder (HCC)     Multiple stresses, m Relevant Orders    SHIKHA SCREENING BILAT (CPT=77067)

## 2017-12-08 NOTE — ASSESSMENT & PLAN NOTE
Normal exam.  Labs as ordered. Skin check normal.  No significant abnormal nevi. Breast exam completed–no palpable abnormalities, discharge from the nipples or axillary adenopathy. No cervical or inguinal lymphadenopathy. Hernial orifices intact.   Rect

## 2017-12-18 NOTE — PROGRESS NOTES
HPI:    Patient ID: Neno Hernandez is a 56year old female. Physical    G3,p3 all nvd,no miscarriages. cycles are regular. Hx of 3 colposcopies. Pap Smear,3 Years due 2017.   Mammogram,1 Yr due on 09/01/2016        Immunization History  Administere Rfl:    ondansetron 4 MG Oral Tablet Dispersible Take 1 tablet (4 mg total) by mouth every 4 (four) hours as needed for Nausea.  Disp: 30 tablet Rfl: 2   hydrocortisone 2.5 % Rectal Cream Apply as directed Disp: 30 g Rfl: 2   tretinoin 0.025 % External Cre Oral Tab Take 10 mg by mouth as needed.    Disp:  Rfl: 1   Elastic Bandages & Supports (TRUFORM STOCKINGS 10-20MMHG) Does not apply Misc WAIST HIGH-4 PAIRS Disp: 4 each Rfl: 3     Allergies:  Adenosine               Shortness of Breath    Comment:Paralysis negative in the right inguinal area and confirmed negative in the left inguinal area.    Genitourinary: Rectum normal and uterus normal. Rectal exam shows no external hemorrhoid, no internal hemorrhoid, no fissure, no mass, no tenderness, anal tone normal a (Completed)    HPV HIGH RISK , THIN PREP COLLECTION (Completed)    THINPREP PAP SMEAR ONLY (Completed)    POTS (postural orthostatic tachycardia syndrome)     Increasing palpitations -off and on with associated nausea.   Pots with tachycardia and orthostasi ADVICE      Orders Placed This Encounter      CBC W Differential W Platelet [E]      Comp Metabolic Panel (14) [E]      Lipid Panel [E]      TSH [E]      Urinalysis, Routine [E]      Vitamin B12 [E]      Vitamin D, 25-Hydroxy [E]      Hpv Dna  High Risk ,

## 2018-02-06 ENCOUNTER — TELEPHONE (OUTPATIENT)
Dept: NEUROLOGY | Facility: CLINIC | Age: 48
End: 2018-02-06

## 2018-02-06 ENCOUNTER — NURSE TRIAGE (OUTPATIENT)
Dept: OTHER | Age: 48
End: 2018-02-06

## 2018-02-06 DIAGNOSIS — M54.16 LUMBAR RADICULOPATHY: Primary | ICD-10-CM

## 2018-02-06 RX ORDER — IBUPROFEN 600 MG/1
600 TABLET ORAL EVERY 8 HOURS PRN
Qty: 90 TABLET | Refills: 0 | Status: SHIPPED | OUTPATIENT
Start: 2018-02-06 | End: 2019-10-03

## 2018-02-06 RX ORDER — GABAPENTIN 300 MG/1
300 CAPSULE ORAL 3 TIMES DAILY
Qty: 90 CAPSULE | Refills: 0 | Status: SHIPPED | OUTPATIENT
Start: 2018-02-06 | End: 2019-02-21

## 2018-02-06 NOTE — TELEPHONE ENCOUNTER
Pt experiencing pain in leg at a level 10, also numbness and losing function on the leg. Also has a history of passing out when she is in a lot of pain is worried that it might happen due to the pain level she has.  Put on schedule, 2/8/18 but wants to know

## 2018-02-06 NOTE — TELEPHONE ENCOUNTER
Spoke with patient, states she has moved heavy boxes 3 weeks ago, since then has had low back pain on left side that radiates down her left leg, patient described as a shooting pain.  Stated the pain is on/off and when it occurs it is a 10/10, also experien

## 2018-02-06 NOTE — TELEPHONE ENCOUNTER
Action Requested: Summary for Provider     []  Critical Lab, Recommendations Needed  [x] Need Additional Advice  []   FYI    []   Need Orders  [] Need Medications Sent to Pharmacy  []  Other     SUMMARY: pt requesting to be seen by PCP only today- Sanjeev Riley

## 2018-02-06 NOTE — TELEPHONE ENCOUNTER
She can get xrays of her low back. Ordered gabapentin 300mg tid for her to take for the shooting leg pain.  Ibuprofen 600mg tid prn escribed also. -jay jay

## 2018-02-07 ENCOUNTER — HOSPITAL ENCOUNTER (OUTPATIENT)
Dept: GENERAL RADIOLOGY | Facility: HOSPITAL | Age: 48
Discharge: HOME | End: 2018-02-07
Attending: PHYSICAL MEDICINE & REHABILITATION
Payer: COMMERCIAL

## 2018-02-07 DIAGNOSIS — M54.16 LUMBAR RADICULOPATHY: ICD-10-CM

## 2018-02-07 PROCEDURE — 72120 X-RAY BEND ONLY L-S SPINE: CPT | Performed by: PHYSICAL MEDICINE & REHABILITATION

## 2018-02-07 NOTE — TELEPHONE ENCOUNTER
Patient was contacted, she has been advised to start on gabapentin and follow-up with Dr. Ronna Raya has been arranged.

## 2018-02-08 ENCOUNTER — HOSPITAL ENCOUNTER (OUTPATIENT)
Dept: GENERAL RADIOLOGY | Facility: HOSPITAL | Age: 48
Discharge: HOME | End: 2018-02-08
Attending: PHYSICAL MEDICINE & REHABILITATION
Payer: COMMERCIAL

## 2018-02-08 ENCOUNTER — TELEPHONE (OUTPATIENT)
Dept: NEUROLOGY | Facility: CLINIC | Age: 48
End: 2018-02-08

## 2018-02-08 ENCOUNTER — OFFICE VISIT (OUTPATIENT)
Dept: NEUROLOGY | Facility: CLINIC | Age: 48
End: 2018-02-08

## 2018-02-08 VITALS
SYSTOLIC BLOOD PRESSURE: 122 MMHG | HEART RATE: 100 BPM | DIASTOLIC BLOOD PRESSURE: 84 MMHG | RESPIRATION RATE: 16 BRPM | HEIGHT: 68 IN | BODY MASS INDEX: 23.04 KG/M2 | WEIGHT: 152 LBS

## 2018-02-08 DIAGNOSIS — M51.26 BULGING LUMBAR DISC: ICD-10-CM

## 2018-02-08 DIAGNOSIS — R10.32 GROIN PAIN, LEFT: ICD-10-CM

## 2018-02-08 DIAGNOSIS — Q79.60 EDS (EHLERS-DANLOS SYNDROME): ICD-10-CM

## 2018-02-08 DIAGNOSIS — M43.16 SPONDYLOLISTHESIS OF LUMBAR REGION: Primary | ICD-10-CM

## 2018-02-08 DIAGNOSIS — M47.816 LUMBAR FACET ARTHROPATHY: ICD-10-CM

## 2018-02-08 DIAGNOSIS — I49.8 POTS (POSTURAL ORTHOSTATIC TACHYCARDIA SYNDROME): ICD-10-CM

## 2018-02-08 PROCEDURE — 99214 OFFICE O/P EST MOD 30 MIN: CPT | Performed by: PHYSICAL MEDICINE & REHABILITATION

## 2018-02-08 PROCEDURE — 73502 X-RAY EXAM HIP UNI 2-3 VIEWS: CPT | Performed by: PHYSICAL MEDICINE & REHABILITATION

## 2018-02-08 NOTE — PATIENT INSTRUCTIONS
- get left hip xray  - get mri of your low back after insurance authorization  - schedule physical therapy  - continue gabapentin and ibuprofen    As of October 6th 2014, the Drug Enforcement Agency [facility]) is reclassifying all hydrocodone combination medica advance and they must present an ID as well. · The name of the person picking up your prescription must be documented in your chart.

## 2018-02-08 NOTE — PROGRESS NOTES
History of Present Illness:   Patient presents with:  Hip Pain: Patient presents for left hip and buttox pain, has been going on for the past week, last week rated a 5/10, pain radiates to groin or side and back of leg.  Taking gabapentin 300 mg tid and iub Shortness of Breath    Comment:Paralysis             Unstable vital signs             Other reaction(s): Respiratory Distress             Paralysis             Unstable vital signs  Epinephrine                 Comment:Other reaction(s):  Other: See Commen mouth daily. Disp:  Rfl: 0   DULoxetine HCl 60 MG Oral Cap DR Particles Take 120 mg by mouth daily. Disp:  Rfl: 0   NON FORMULARY Potassium, magnesium, and sodium tablet which pt adds to water.  Disp:  Rfl:    sodium chloride 1 G Oral Tab Take 3 g by mo flexion and extension, bilateral sidebending of lumbar spine. Aparna's : bethany slight incr knee heights more on left than right. Posture:  head and shoulders forward, incr thoracic kyphosis, scoliosis.    Single leg stance:   bethany decr balance  Gait:  Normal

## 2018-02-09 NOTE — TELEPHONE ENCOUNTER
[employer] for authorization of approval for MRI L-spine wo. Approval was given with Authorization # [id number] effective 02/08/18 to 03/09/18. Will call Pt. to inform. Pt. informed of approval. Can proceed with scheduling appt.

## 2018-02-12 ENCOUNTER — TELEPHONE (OUTPATIENT)
Dept: NEUROLOGY | Facility: CLINIC | Age: 48
End: 2018-02-12

## 2018-02-15 ENCOUNTER — OFFICE VISIT (OUTPATIENT)
Dept: PHYSICAL THERAPY | Age: 48
End: 2018-02-15
Attending: PHYSICAL MEDICINE & REHABILITATION
Payer: COMMERCIAL

## 2018-02-15 DIAGNOSIS — M43.16 SPONDYLOLISTHESIS OF LUMBAR REGION: ICD-10-CM

## 2018-02-15 DIAGNOSIS — M47.816 LUMBAR FACET ARTHROPATHY: ICD-10-CM

## 2018-02-15 DIAGNOSIS — Q79.60 EDS (EHLERS-DANLOS SYNDROME): ICD-10-CM

## 2018-02-15 DIAGNOSIS — M51.26 BULGING LUMBAR DISC: ICD-10-CM

## 2018-02-15 DIAGNOSIS — I49.8 POTS (POSTURAL ORTHOSTATIC TACHYCARDIA SYNDROME): ICD-10-CM

## 2018-02-15 PROCEDURE — 97110 THERAPEUTIC EXERCISES: CPT

## 2018-02-15 PROCEDURE — 97530 THERAPEUTIC ACTIVITIES: CPT

## 2018-02-15 PROCEDURE — 97162 PT EVAL MOD COMPLEX 30 MIN: CPT

## 2018-02-15 NOTE — PROGRESS NOTES
LUMBAR SPINE EVALUATION:   Referring Physician: Dr. Anna Martins  Date of Onset: 12/31/2017 Date of Service: 2/15/2018   Diagnosis: Spondylolisthesis of lumbar region (M43.16)  Lumbar facet arthropathy (HCC) (M46.96)  Bulging lumbar disc (M51.26)  POTS (postural extending backwards  Relieving Factors: pain meds    Pain Rating VAS: Current = 2/10, Best = 2/10, Worst = 10/10    Current functional limitations include: standing, getting out of bed, reaching overhead, extending backwards; stairs.      Norrine Fridge buttock   L Sidebend Min/mod loss Pain in L buttock and L lumbar   R Rotation No     L Rotation Min/mod loss Pain at L lumbar   R Sideglide Min loss Pain at L lumbar/L hip    L Sideglide No loss      Repeated Motion Testing: NT    Balance: SLS R = NT, L = supine to sit transfers upon waking to rise up from bed without difficulty. 3. Patient will have adequate hip flexion AROM and strength to ascend/descend stairs without difficulty.   4. Patient will be able to stand at least 30 minutes to cook and clean at

## 2018-02-16 ENCOUNTER — HOSPITAL ENCOUNTER (OUTPATIENT)
Dept: MRI IMAGING | Age: 48
Discharge: HOME | End: 2018-02-16
Attending: PHYSICAL MEDICINE & REHABILITATION
Payer: COMMERCIAL

## 2018-02-16 DIAGNOSIS — M43.16 SPONDYLOLISTHESIS OF LUMBAR REGION: ICD-10-CM

## 2018-02-16 DIAGNOSIS — M47.816 LUMBAR FACET ARTHROPATHY: ICD-10-CM

## 2018-02-16 DIAGNOSIS — M51.26 BULGING LUMBAR DISC: ICD-10-CM

## 2018-02-16 PROCEDURE — 72148 MRI LUMBAR SPINE W/O DYE: CPT | Performed by: PHYSICAL MEDICINE & REHABILITATION

## 2018-02-19 ENCOUNTER — TELEPHONE (OUTPATIENT)
Dept: NEUROLOGY | Facility: CLINIC | Age: 48
End: 2018-02-19

## 2018-02-20 ENCOUNTER — OFFICE VISIT (OUTPATIENT)
Dept: PHYSICAL THERAPY | Age: 48
End: 2018-02-20
Attending: PHYSICAL MEDICINE & REHABILITATION
Payer: COMMERCIAL

## 2018-02-20 ENCOUNTER — TELEPHONE (OUTPATIENT)
Dept: NEUROLOGY | Facility: CLINIC | Age: 48
End: 2018-02-20

## 2018-02-20 DIAGNOSIS — Q79.60 EDS (EHLERS-DANLOS SYNDROME): ICD-10-CM

## 2018-02-20 DIAGNOSIS — M43.16 SPONDYLOLISTHESIS OF LUMBAR REGION: ICD-10-CM

## 2018-02-20 DIAGNOSIS — M51.26 BULGING LUMBAR DISC: ICD-10-CM

## 2018-02-20 DIAGNOSIS — M47.816 LUMBAR FACET ARTHROPATHY: ICD-10-CM

## 2018-02-20 DIAGNOSIS — I49.8 POTS (POSTURAL ORTHOSTATIC TACHYCARDIA SYNDROME): ICD-10-CM

## 2018-02-20 PROCEDURE — 97112 NEUROMUSCULAR REEDUCATION: CPT

## 2018-02-20 PROCEDURE — 97530 THERAPEUTIC ACTIVITIES: CPT

## 2018-02-20 PROCEDURE — 97140 MANUAL THERAPY 1/> REGIONS: CPT

## 2018-02-20 PROCEDURE — 97110 THERAPEUTIC EXERCISES: CPT

## 2018-02-20 NOTE — TELEPHONE ENCOUNTER
MRI results reviewed; she has a mild disc bulge with narrowing of the space where the nerve comes out at the left L4-5 level. The rest of the MRI appears normal. Overall the findings are mild but are enough to cause pain.

## 2018-02-20 NOTE — PROGRESS NOTES
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 Yareli Barger or Dr. Angela Valderrama to transfer her physiatry c home without increased symptoms.   5. Patient will be able to reach overhead for items on shelves with good scapular and trunk control and without provocation of back symptoms    Plan: Continue to focus on gentle ROM and stabilization exercises    Charges:

## 2018-02-21 ENCOUNTER — OFFICE VISIT (OUTPATIENT)
Dept: NEUROLOGY | Facility: CLINIC | Age: 48
End: 2018-02-21

## 2018-02-21 ENCOUNTER — TELEPHONE (OUTPATIENT)
Dept: NEUROLOGY | Facility: CLINIC | Age: 48
End: 2018-02-21

## 2018-02-21 VITALS — RESPIRATION RATE: 15 BRPM | HEART RATE: 108 BPM | SYSTOLIC BLOOD PRESSURE: 142 MMHG | DIASTOLIC BLOOD PRESSURE: 88 MMHG

## 2018-02-21 DIAGNOSIS — M62.838 MUSCLE SPASM OF LEFT LOWER EXTREMITY: Primary | ICD-10-CM

## 2018-02-21 DIAGNOSIS — M25.552 LEFT HIP PAIN: ICD-10-CM

## 2018-02-21 PROCEDURE — 99214 OFFICE O/P EST MOD 30 MIN: CPT | Performed by: PHYSICAL MEDICINE & REHABILITATION

## 2018-02-21 RX ORDER — METHYLPREDNISOLONE 4 MG/1
TABLET ORAL
Qty: 1 PACKAGE | Refills: 0 | Status: SHIPPED | OUTPATIENT
Start: 2018-02-21 | End: 2018-08-24 | Stop reason: ALTCHOICE

## 2018-02-21 RX ORDER — BACLOFEN 10 MG/1
10 TABLET ORAL 2 TIMES DAILY PRN
Qty: 30 TABLET | Refills: 0 | Status: SHIPPED | OUTPATIENT
Start: 2018-02-21 | End: 2019-10-16 | Stop reason: ALTCHOICE

## 2018-02-21 NOTE — H&P
[facility], [place] [address], [address], [facility]    History and Physical    Naoma Annas Patient Status:  No patient class for patient encounter    1970 MRN [id number]   Location [facility], [address] [place] [address] Alcohol use: Yes           0.0 oz/week     Comment: 1-2 beers/week    Allergies/Medications:    Allergies:   Adenosine               Shortness of Breath    Comment:Paralysis             Unstable vital signs             Other reaction(s): Respiratory Distr

## 2018-02-21 NOTE — TELEPHONE ENCOUNTER
[employer] for authorization of approval for MRI left hip wo. Approval was given with Authorization # [id number] effective 02/21/18 to 03/22/18. Will call Pt. to inform.  MRI is scheduled tomorrow at [facility]

## 2018-02-21 NOTE — TELEPHONE ENCOUNTER
[employer] for authorization of approval for MRI left hip arthrogram w cpt code 31269. Approval was given with   Authorization # [id number] effective 02/21/18 to 03/22/18. Will call Pt. to inform. Pt. Informed we will have to cancel appt.  for MRI of left

## 2018-02-21 NOTE — TELEPHONE ENCOUNTER
Spoke to patient and advised Dr Orlando Guaman is in surgery doing procedures. Will give him message upon his arrival in office. Patient expressed understanding.

## 2018-02-21 NOTE — TELEPHONE ENCOUNTER
Spoke to patient for over 10 minutes. Patient very upset she was not notified when Dr Roger Iraheta left and all other patient were notified earlier than her. Apologized to patient.      States she should have been told immediately when Dr Roger Iraheta left, has had appointme

## 2018-02-21 NOTE — PROGRESS NOTES
HPI:    Patient ID: Faustino Juarez is a 53year old female. This is a 67-year-old female with a past medical history of POTS, Elhers-Danlos syndrome, peripheral neuropathy who was a former patient of Dr. Felicia Richter.   She presents as an urgent follow-up Negative for color change and rash. Neurological: Positive for syncope, weakness, light-headedness and numbness. All other systems reviewed and are negative.            Current Outpatient Prescriptions:  baclofen 10 MG Oral Tab Take 1 tablet (10 mg tota amphetamine-dextroamphetamine 10 MG Oral Tab Take 10 mg by mouth daily. Disp:  Rfl: 0   DULoxetine HCl 60 MG Oral Cap DR Particles Take 120 mg by mouth daily.    Disp:  Rfl: 0   NON FORMULARY Potassium, magnesium, and sodium tablet which pt adds to wate extension, ankle dorsiflexion, EHL, ankle plantarflexion bilaterally. Sensation: Decreased sensation to the left leg in a nondermatomal distribution (lateral lower leg, medial lower leg)    Reflexes: 3/4 right quad reflex.  2/4 left quad and gastroc refl

## 2018-02-22 ENCOUNTER — APPOINTMENT (OUTPATIENT)
Dept: PHYSICAL THERAPY | Age: 48
End: 2018-02-22
Attending: PHYSICAL MEDICINE & REHABILITATION
Payer: COMMERCIAL

## 2018-02-23 NOTE — TELEPHONE ENCOUNTER
They don't usually use anything containing epinephrine unless they're doing breast biopsies. When they do the procedure if she tells them her concerns they would just use plain lidocaine, which is what they use anyways.  Is she asking for something to reduc

## 2018-02-23 NOTE — TELEPHONE ENCOUNTER
Pt has questions about the anesthesia that will be used for the MRI. Pt states that the is epinephrine in all local anesthesia and she is allergic to it.  Please advise

## 2018-02-23 NOTE — TELEPHONE ENCOUNTER
Spoke with patient, stated she cannot have epinephrine and is concerned that she will not get the appropriate anesthesia.  She stated she called radiology and was instructed to call Dr Lainey Lo office to find out if there is anything she can take before he

## 2018-02-26 ENCOUNTER — HOSPITAL ENCOUNTER (OUTPATIENT)
Dept: MRI IMAGING | Facility: HOSPITAL | Age: 48
Discharge: HOME | End: 2018-02-26
Attending: PHYSICAL MEDICINE & REHABILITATION
Payer: COMMERCIAL

## 2018-02-26 ENCOUNTER — HOSPITAL ENCOUNTER (OUTPATIENT)
Dept: GENERAL RADIOLOGY | Facility: HOSPITAL | Age: 48
Discharge: HOME | End: 2018-02-26
Attending: PHYSICAL MEDICINE & REHABILITATION
Payer: COMMERCIAL

## 2018-02-26 ENCOUNTER — LAB ENCOUNTER (OUTPATIENT)
Dept: LAB | Facility: HOSPITAL | Age: 48
End: 2018-02-26
Attending: PHYSICAL MEDICINE & REHABILITATION
Payer: COMMERCIAL

## 2018-02-26 DIAGNOSIS — G90.9 AUTONOMIC DYSFUNCTION: ICD-10-CM

## 2018-02-26 DIAGNOSIS — M25.552 LEFT HIP PAIN: ICD-10-CM

## 2018-02-26 DIAGNOSIS — F32.A DEPRESSIVE DISORDER: ICD-10-CM

## 2018-02-26 DIAGNOSIS — E78.2 MIXED HYPERLIPIDEMIA: ICD-10-CM

## 2018-02-26 DIAGNOSIS — Z00.00 ROUTINE GENERAL MEDICAL EXAMINATION AT A HEALTH CARE FACILITY: ICD-10-CM

## 2018-02-26 LAB
25(OH)D3 SERPL-MCNC: 35.1 NG/ML
ALBUMIN SERPL BCP-MCNC: 4.5 G/DL (ref 3.5–4.8)
ALBUMIN/GLOB SERPL: 1.4 {RATIO} (ref 1–2)
ALP SERPL-CCNC: 40 U/L (ref 32–100)
ALT SERPL-CCNC: 22 U/L (ref 14–54)
ANION GAP SERPL CALC-SCNC: 8 MMOL/L (ref 0–18)
AST SERPL-CCNC: 19 U/L (ref 15–41)
BASOPHILS # BLD: 0 K/UL (ref 0–0.2)
BASOPHILS NFR BLD: 1 %
BILIRUB SERPL-MCNC: 0.9 MG/DL (ref 0.3–1.2)
BUN SERPL-MCNC: 14 MG/DL (ref 8–20)
BUN/CREAT SERPL: 12.8 (ref 10–20)
CALCIUM SERPL-MCNC: 10.4 MG/DL (ref 8.5–10.5)
CHLORIDE SERPL-SCNC: 100 MMOL/L (ref 95–110)
CHOLEST SERPL-MCNC: 243 MG/DL (ref 110–200)
CO2 SERPL-SCNC: 31 MMOL/L (ref 22–32)
CREAT SERPL-MCNC: 1.09 MG/DL (ref 0.5–1.5)
EOSINOPHIL # BLD: 0.1 K/UL (ref 0–0.7)
EOSINOPHIL NFR BLD: 1 %
ERYTHROCYTE [DISTWIDTH] IN BLOOD BY AUTOMATED COUNT: 12.8 % (ref 11–15)
GLOBULIN PLAS-MCNC: 3.2 G/DL (ref 2.5–3.7)
GLUCOSE SERPL-MCNC: 119 MG/DL (ref 70–99)
HCT VFR BLD AUTO: 50.2 % (ref 35–48)
HDLC SERPL-MCNC: 69 MG/DL
HGB BLD-MCNC: 17 G/DL (ref 12–16)
LDLC SERPL CALC-MCNC: 150 MG/DL (ref 0–99)
LYMPHOCYTES # BLD: 2 K/UL (ref 1–4)
LYMPHOCYTES NFR BLD: 27 %
MCH RBC QN AUTO: 31.7 PG (ref 27–32)
MCHC RBC AUTO-ENTMCNC: 33.8 G/DL (ref 32–37)
MCV RBC AUTO: 93.6 FL (ref 80–100)
MONOCYTES # BLD: 0.5 K/UL (ref 0–1)
MONOCYTES NFR BLD: 7 %
NEUTROPHILS # BLD AUTO: 4.7 K/UL (ref 1.8–7.7)
NEUTROPHILS NFR BLD: 65 %
NONHDLC SERPL-MCNC: 174 MG/DL
OSMOLALITY UR CALC.SUM OF ELEC: 290 MOSM/KG (ref 275–295)
PATIENT FASTING: YES
PLATELET # BLD AUTO: 282 K/UL (ref 140–400)
PMV BLD AUTO: 9.6 FL (ref 7.4–10.3)
POTASSIUM SERPL-SCNC: 4.9 MMOL/L (ref 3.3–5.1)
PROT SERPL-MCNC: 7.7 G/DL (ref 5.9–8.4)
RBC # BLD AUTO: 5.36 M/UL (ref 3.7–5.4)
SODIUM SERPL-SCNC: 139 MMOL/L (ref 136–144)
TRIGL SERPL-MCNC: 121 MG/DL (ref 1–149)
TSH SERPL-ACNC: 1.49 UIU/ML (ref 0.45–5.33)
VIT B12 SERPL-MCNC: 726 PG/ML (ref 181–914)
WBC # BLD AUTO: 7.3 K/UL (ref 4–11)

## 2018-02-26 PROCEDURE — 82306 VITAMIN D 25 HYDROXY: CPT

## 2018-02-26 PROCEDURE — 36415 COLL VENOUS BLD VENIPUNCTURE: CPT

## 2018-02-26 PROCEDURE — 80061 LIPID PANEL: CPT

## 2018-02-26 PROCEDURE — 82607 VITAMIN B-12: CPT

## 2018-02-26 PROCEDURE — 85025 COMPLETE CBC W/AUTO DIFF WBC: CPT

## 2018-02-26 PROCEDURE — 77002 NEEDLE LOCALIZATION BY XRAY: CPT | Performed by: PHYSICAL MEDICINE & REHABILITATION

## 2018-02-26 PROCEDURE — 27093 INJECTION FOR HIP X-RAY: CPT | Performed by: PHYSICAL MEDICINE & REHABILITATION

## 2018-02-26 PROCEDURE — 80053 COMPREHEN METABOLIC PANEL: CPT

## 2018-02-26 PROCEDURE — 84443 ASSAY THYROID STIM HORMONE: CPT

## 2018-02-26 PROCEDURE — 73722 MRI JOINT OF LWR EXTR W/DYE: CPT | Performed by: PHYSICAL MEDICINE & REHABILITATION

## 2018-02-26 PROCEDURE — A9575 INJ GADOTERATE MEGLUMI 0.1ML: HCPCS | Performed by: PHYSICAL MEDICINE & REHABILITATION

## 2018-02-26 RX ORDER — LIDOCAINE HYDROCHLORIDE 20 MG/ML
INJECTION, SOLUTION EPIDURAL; INFILTRATION; INTRACAUDAL; PERINEURAL
Status: DISPENSED
Start: 2018-02-26 | End: 2018-02-26

## 2018-02-27 ENCOUNTER — APPOINTMENT (OUTPATIENT)
Dept: PHYSICAL THERAPY | Age: 48
End: 2018-02-27
Attending: PHYSICAL MEDICINE & REHABILITATION
Payer: COMMERCIAL

## 2018-03-06 ENCOUNTER — OFFICE VISIT (OUTPATIENT)
Dept: PHYSICAL THERAPY | Age: 48
End: 2018-03-06
Attending: PHYSICAL MEDICINE & REHABILITATION
Payer: COMMERCIAL

## 2018-03-06 DIAGNOSIS — M47.816 LUMBAR FACET ARTHROPATHY: ICD-10-CM

## 2018-03-06 DIAGNOSIS — I49.8 POTS (POSTURAL ORTHOSTATIC TACHYCARDIA SYNDROME): ICD-10-CM

## 2018-03-06 DIAGNOSIS — Q79.60 EDS (EHLERS-DANLOS SYNDROME): ICD-10-CM

## 2018-03-06 DIAGNOSIS — M51.26 BULGING LUMBAR DISC: ICD-10-CM

## 2018-03-06 DIAGNOSIS — M43.16 SPONDYLOLISTHESIS OF LUMBAR REGION: ICD-10-CM

## 2018-03-06 PROCEDURE — 97140 MANUAL THERAPY 1/> REGIONS: CPT

## 2018-03-06 PROCEDURE — 97112 NEUROMUSCULAR REEDUCATION: CPT

## 2018-03-06 PROCEDURE — 97110 THERAPEUTIC EXERCISES: CPT

## 2018-03-06 NOTE — PROGRESS NOTES
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 Sapphire Lindquist or Dr. Amanda Cabrera to transfer her physiatry care X 10 min  - standing:  Toe taps lateral 10x1  - sta

## 2018-03-08 ENCOUNTER — OFFICE VISIT (OUTPATIENT)
Dept: PHYSICAL THERAPY | Age: 48
End: 2018-03-08
Attending: PHYSICAL MEDICINE & REHABILITATION
Payer: COMMERCIAL

## 2018-03-08 DIAGNOSIS — Q79.60 EDS (EHLERS-DANLOS SYNDROME): ICD-10-CM

## 2018-03-08 DIAGNOSIS — M43.16 SPONDYLOLISTHESIS OF LUMBAR REGION: ICD-10-CM

## 2018-03-08 DIAGNOSIS — M47.816 LUMBAR FACET ARTHROPATHY: ICD-10-CM

## 2018-03-08 DIAGNOSIS — M51.26 BULGING LUMBAR DISC: ICD-10-CM

## 2018-03-08 DIAGNOSIS — I49.8 POTS (POSTURAL ORTHOSTATIC TACHYCARDIA SYNDROME): ICD-10-CM

## 2018-03-08 PROCEDURE — 97140 MANUAL THERAPY 1/> REGIONS: CPT

## 2018-03-08 PROCEDURE — 97110 THERAPEUTIC EXERCISES: CPT

## 2018-03-08 PROCEDURE — 97112 NEUROMUSCULAR REEDUCATION: CPT

## 2018-03-08 NOTE — PROGRESS NOTES
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 4. Patient will be able to stand at least 30 minutes to cook and clean at home without increased symptoms.   5. Patient will be able to reach overhead for items on shelves with good scapular and trunk control and without provocat

## 2018-03-13 ENCOUNTER — APPOINTMENT (OUTPATIENT)
Dept: PHYSICAL THERAPY | Age: 48
End: 2018-03-13
Attending: PHYSICAL MEDICINE & REHABILITATION
Payer: COMMERCIAL

## 2018-03-15 ENCOUNTER — TELEPHONE (OUTPATIENT)
Dept: PHYSICAL THERAPY | Facility: HOSPITAL | Age: 48
End: 2018-03-15

## 2018-03-15 NOTE — TELEPHONE ENCOUNTER
Pt did not show or call for her appointment today. I called and left a message and confirmed her next appointment. Requested a return call.

## 2018-03-20 ENCOUNTER — OFFICE VISIT (OUTPATIENT)
Dept: NEUROLOGY | Facility: CLINIC | Age: 48
End: 2018-03-20

## 2018-03-20 VITALS
WEIGHT: 153.81 LBS | BODY MASS INDEX: 23.31 KG/M2 | SYSTOLIC BLOOD PRESSURE: 110 MMHG | DIASTOLIC BLOOD PRESSURE: 64 MMHG | HEART RATE: 87 BPM | HEIGHT: 68 IN | RESPIRATION RATE: 18 BRPM

## 2018-03-20 DIAGNOSIS — R10.32 LEFT GROIN PAIN: ICD-10-CM

## 2018-03-20 DIAGNOSIS — M48.061 FORAMINAL STENOSIS OF LUMBAR REGION: Primary | ICD-10-CM

## 2018-03-20 PROCEDURE — 99213 OFFICE O/P EST LOW 20 MIN: CPT | Performed by: PHYSICAL MEDICINE & REHABILITATION

## 2018-03-20 NOTE — PROGRESS NOTES
HPI:    Patient ID: Irlanda Qiu is a 53year old female. This 45-year-old female presents for follow-up.   She was last seen on 2/21/2018 for follow-up of left lower back pain radiating to the lateral thigh and groin, worse with walking and stand significance. She wants to return to cardiac rehab and is asking if it is okay for her to use a recumbent bike. Review of Systems   Constitutional: Negative for chills and fever. HENT: Negative for rhinorrhea and sore throat.     Musculoskeletal: Ne PO QAM Disp:  Rfl: 0   Fluticasone Propionate 50 MCG/ACT Nasal Suspension 1 PUFF EACH NOSTRIL 2 TIMES A  DAY (Patient taking differently: as needed.  1 PUFF EACH NOSTRIL 2 TIMES A  DAY ) Disp: 1 Bottle Rfl: 3   amphetamine-dextroamphetamine 10 MG Oral Tab T bilaterally    Krishnamurthy test: Negative bilaterally. Passive range of motion: No appreciable spasticity in the upper extremities with passive ROM. She does have a catch/release with passive hip adduction, left worse than right.  No spasticity seen in the ank

## 2018-03-20 NOTE — PATIENT INSTRUCTIONS
As of October 6th 2014, the Drug Enforcement Agency [facility]) is reclassifying all hydrocodone combination medications from Schedule III to Schedule II. This includes medications such as Norco, Vicodin, Lortab, Zohydro, and Vicoprofen.     What this means for y

## 2018-03-22 ENCOUNTER — OFFICE VISIT (OUTPATIENT)
Dept: PHYSICAL THERAPY | Age: 48
End: 2018-03-22
Attending: PHYSICAL MEDICINE & REHABILITATION
Payer: COMMERCIAL

## 2018-03-22 PROCEDURE — 97140 MANUAL THERAPY 1/> REGIONS: CPT

## 2018-03-22 PROCEDURE — 97110 THERAPEUTIC EXERCISES: CPT

## 2018-03-22 PROCEDURE — 97112 NEUROMUSCULAR REEDUCATION: CPT

## 2018-03-22 NOTE — PROGRESS NOTES
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 Jan Nolen or Dr. Cece Graham to transfer her physiatry care  - Supine to sitting log roll technique  - sit to stand: emphasis on appropriate weight shift/hip hinge  -- -   TNE = Therapeutic Neuroscience Education  BKFO = bent knee fall ou

## 2018-03-29 ENCOUNTER — OFFICE VISIT (OUTPATIENT)
Dept: PHYSICAL THERAPY | Age: 48
End: 2018-03-29
Attending: PHYSICAL MEDICINE & REHABILITATION
Payer: COMMERCIAL

## 2018-03-29 PROCEDURE — 97110 THERAPEUTIC EXERCISES: CPT

## 2018-03-29 NOTE — PROGRESS NOTES
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

## 2018-03-31 RX ORDER — FLUCONAZOLE 150 MG/1
TABLET ORAL
Qty: 1 TABLET | Refills: 0 | Status: SHIPPED | OUTPATIENT
Start: 2018-03-31 | End: 2019-10-22

## 2018-03-31 RX ORDER — FLUTICASONE PROPIONATE 50 MCG
SPRAY, SUSPENSION (ML) NASAL
Qty: 1 BOTTLE | Refills: 5 | Status: SHIPPED | OUTPATIENT
Start: 2018-03-31 | End: 2019-10-03

## 2018-04-03 ENCOUNTER — APPOINTMENT (OUTPATIENT)
Dept: PHYSICAL THERAPY | Age: 48
End: 2018-04-03
Payer: COMMERCIAL

## 2018-04-10 ENCOUNTER — APPOINTMENT (OUTPATIENT)
Dept: PHYSICAL THERAPY | Age: 48
End: 2018-04-10
Payer: COMMERCIAL

## 2018-04-24 ENCOUNTER — APPOINTMENT (OUTPATIENT)
Dept: PHYSICAL THERAPY | Age: 48
End: 2018-04-24
Payer: COMMERCIAL

## 2018-05-16 ENCOUNTER — NURSE TRIAGE (OUTPATIENT)
Dept: OTHER | Age: 48
End: 2018-05-16

## 2018-05-16 RX ORDER — CIPROFLOXACIN 500 MG/1
500 TABLET, FILM COATED ORAL 2 TIMES DAILY
Qty: 10 TABLET | Refills: 0 | Status: SHIPPED | OUTPATIENT
Start: 2018-05-16 | End: 2018-05-21

## 2018-05-16 RX ORDER — FLUCONAZOLE 150 MG/1
150 TABLET ORAL ONCE
Qty: 1 TABLET | Refills: 0 | Status: SHIPPED | OUTPATIENT
Start: 2018-05-16 | End: 2018-07-17

## 2018-05-16 NOTE — TELEPHONE ENCOUNTER
Advised patient of Dr. Adriana Francois note. Patient verbalized understanding. Rx's sent to pharmacy.

## 2018-05-16 NOTE — TELEPHONE ENCOUNTER
Action Requested: Summary for Provider     []  Critical Lab, Recommendations Needed  [] Need Additional Advice  []   FYI    []   Need Orders  [x] Need Medications Sent to Pharmacy  []  Other     SUMMARY: Urinary frequency/urgency, burning and pain on urina

## 2018-06-01 NOTE — PROGRESS NOTES
6/1/2018: pt last seen on 3/29/18. She canceled her remaining visits for unknown reason.  Discharge from PT.

## 2018-07-19 RX ORDER — FLUCONAZOLE 150 MG/1
TABLET ORAL
Qty: 1 TABLET | Refills: 0 | Status: SHIPPED | OUTPATIENT
Start: 2018-07-19 | End: 2019-02-21

## 2018-08-09 ENCOUNTER — TELEPHONE (OUTPATIENT)
Dept: INTERNAL MEDICINE CLINIC | Facility: CLINIC | Age: 48
End: 2018-08-09

## 2018-08-09 NOTE — TELEPHONE ENCOUNTER
Refill Protocol Appointment Criteria  · Appointment scheduled in the past 12 months or in the next 3 months  Recent Outpatient Visits            4 months ago     [address], [place]    Office Visit

## 2018-08-10 RX ORDER — NORGESTIMATE AND ETHINYL ESTRADIOL 0.25-0.035
1 KIT ORAL DAILY
Qty: 28 TABLET | Refills: 5 | Status: SHIPPED | OUTPATIENT
Start: 2018-08-10 | End: 2019-02-18

## 2018-08-13 ENCOUNTER — TELEPHONE (OUTPATIENT)
Dept: INTERNAL MEDICINE CLINIC | Facility: CLINIC | Age: 48
End: 2018-08-13

## 2018-08-14 RX ORDER — ONDANSETRON 4 MG/1
TABLET, ORALLY DISINTEGRATING ORAL
Qty: 30 TABLET | Refills: 0 | Status: SHIPPED | OUTPATIENT
Start: 2018-08-14 | End: 2018-08-24 | Stop reason: ALTCHOICE

## 2018-08-24 ENCOUNTER — HOSPITAL ENCOUNTER (OUTPATIENT)
Age: 48
Discharge: HOME | End: 2018-08-24
Attending: EMERGENCY MEDICINE
Payer: COMMERCIAL

## 2018-08-24 VITALS
BODY MASS INDEX: 22.73 KG/M2 | HEIGHT: 68 IN | HEART RATE: 78 BPM | SYSTOLIC BLOOD PRESSURE: 118 MMHG | RESPIRATION RATE: 18 BRPM | DIASTOLIC BLOOD PRESSURE: 80 MMHG | OXYGEN SATURATION: 100 % | WEIGHT: 150 LBS | TEMPERATURE: 98 F

## 2018-08-24 DIAGNOSIS — N30.00 ACUTE CYSTITIS WITHOUT HEMATURIA: Primary | ICD-10-CM

## 2018-08-24 LAB
B-HCG UR QL: NEGATIVE
GLUCOSE UR STRIP-MCNC: 100 MG/DL
NITRITE UR QL STRIP: POSITIVE
PH UR STRIP: 5 [PH]
PROT UR STRIP-MCNC: 100 MG/DL
SP GR UR STRIP: 1.02
UROBILINOGEN UR STRIP-ACNC: 4 MG/DL

## 2018-08-24 PROCEDURE — 87077 CULTURE AEROBIC IDENTIFY: CPT | Performed by: EMERGENCY MEDICINE

## 2018-08-24 PROCEDURE — 81025 URINE PREGNANCY TEST: CPT

## 2018-08-24 PROCEDURE — 87186 SC STD MICRODIL/AGAR DIL: CPT | Performed by: EMERGENCY MEDICINE

## 2018-08-24 PROCEDURE — 99214 OFFICE O/P EST MOD 30 MIN: CPT

## 2018-08-24 PROCEDURE — 81003 URINALYSIS AUTO W/O SCOPE: CPT

## 2018-08-24 PROCEDURE — 87086 URINE CULTURE/COLONY COUNT: CPT | Performed by: EMERGENCY MEDICINE

## 2018-08-24 RX ORDER — PHENAZOPYRIDINE HYDROCHLORIDE 200 MG/1
200 TABLET, FILM COATED ORAL 3 TIMES DAILY PRN
Qty: 6 TABLET | Refills: 0 | Status: SHIPPED | OUTPATIENT
Start: 2018-08-24 | End: 2018-08-31

## 2018-08-24 RX ORDER — NITROFURANTOIN 25; 75 MG/1; MG/1
100 CAPSULE ORAL 2 TIMES DAILY
Qty: 6 CAPSULE | Refills: 0 | Status: SHIPPED | OUTPATIENT
Start: 2018-08-24 | End: 2018-08-27

## 2018-08-24 RX ORDER — FLUCONAZOLE 150 MG/1
150 TABLET ORAL ONCE
Qty: 1 TABLET | Refills: 0 | Status: SHIPPED | OUTPATIENT
Start: 2018-08-24 | End: 2018-08-24

## 2018-08-24 NOTE — ED INITIAL ASSESSMENT (HPI)
Pt to IC with UTI symptoms, occasional pelvic discomfort (pt currently has her period), urinary frequency. Denies N/V. Occasional diarrhea. Denies flank pain. States she felt feverish. Symptoms for 2 days. Pt states she is taking Azo and Cystex.

## 2018-08-24 NOTE — ED PROVIDER NOTES
Patient Seen in: [facility] [address] [place] [address]    History   Patient presents with:  Urinary Symptoms (urologic)    Stated Complaint: uti    HPI    The patient is a 52-year-old female who presents with 2-3 days of dysuria urgency frequency 08/22/2018 (Approximate)   SpO2 100%   Breastfeeding? No   BMI 22.81 kg/m²         Physical Exam   Constitutional: She is oriented to person, place, and time. She appears well-developed and well-nourished. HENT:   Head: Normocephalic and atraumatic.    Lianne Sandoval Oral Cap  Take 1 capsule (100 mg total) by mouth 2 (two) times daily. Qty: 6 capsule Refills: 0    Phenazopyridine HCl 200 MG Oral Tab  Take 1 tablet (200 mg total) by mouth 3 (three) times daily as needed for Pain.   Qty: 6 tablet Refills: 0    !! flucona

## 2018-09-05 NOTE — TELEPHONE ENCOUNTER
Please let this pt know that she will need to see dermatology to over ride the denial for this medicatione

## 2018-11-26 RX ORDER — ONDANSETRON 4 MG/1
TABLET, ORALLY DISINTEGRATING ORAL
Qty: 30 TABLET | Refills: 0 | Status: SHIPPED | OUTPATIENT
Start: 2018-11-26 | End: 2019-01-24

## 2019-01-24 NOTE — TELEPHONE ENCOUNTER
Please review; protocol failed.     Refill Protocol Appointment Criteria  · Appointment scheduled in the past 12 months or in the next 3 months  Recent Outpatient Visits            10 months ago     [facility]

## 2019-01-30 RX ORDER — ONDANSETRON 4 MG/1
TABLET, ORALLY DISINTEGRATING ORAL
Qty: 30 TABLET | Refills: 5 | Status: SHIPPED | OUTPATIENT
Start: 2019-01-30 | End: 2019-10-03

## 2019-02-09 RX ORDER — NORGESTIMATE AND ETHINYL ESTRADIOL 0.25-0.035
1 KIT ORAL DAILY
Qty: 28 TABLET | Refills: 0 | OUTPATIENT
Start: 2019-02-09

## 2019-02-18 RX ORDER — NORGESTIMATE AND ETHINYL ESTRADIOL 0.25-0.035
1 KIT ORAL DAILY
Qty: 28 TABLET | Refills: 0 | Status: SHIPPED | OUTPATIENT
Start: 2019-02-18 | End: 2019-02-21

## 2019-02-18 NOTE — TELEPHONE ENCOUNTER
BC type: Estarylla  Pt last seen: 8/15/2017   Last pap: 12/9/17 with PCP  Pap results: normal  Last refill: 8/10/18 for 6 month supply  Next appt scheduled: 2/21/19    Falls outside of RN protocol. RN routing to provider for consideration.

## 2019-02-21 ENCOUNTER — OFFICE VISIT (OUTPATIENT)
Dept: OBGYN CLINIC | Facility: CLINIC | Age: 49
End: 2019-02-21
Payer: COMMERCIAL

## 2019-02-21 ENCOUNTER — TELEPHONE (OUTPATIENT)
Dept: INTERNAL MEDICINE CLINIC | Facility: CLINIC | Age: 49
End: 2019-02-21

## 2019-02-21 ENCOUNTER — LAB ENCOUNTER (OUTPATIENT)
Dept: LAB | Facility: HOSPITAL | Age: 49
End: 2019-02-21
Attending: OBSTETRICS & GYNECOLOGY
Payer: COMMERCIAL

## 2019-02-21 VITALS
WEIGHT: 165.38 LBS | HEIGHT: 68 IN | HEART RATE: 115 BPM | SYSTOLIC BLOOD PRESSURE: 130 MMHG | BODY MASS INDEX: 25.07 KG/M2 | DIASTOLIC BLOOD PRESSURE: 88 MMHG

## 2019-02-21 DIAGNOSIS — Z12.31 VISIT FOR SCREENING MAMMOGRAM: Primary | ICD-10-CM

## 2019-02-21 DIAGNOSIS — Z11.3 ROUTINE SCREENING FOR STI (SEXUALLY TRANSMITTED INFECTION): ICD-10-CM

## 2019-02-21 DIAGNOSIS — Z01.419 ENCOUNTER FOR WELL WOMAN EXAM WITH ROUTINE GYNECOLOGICAL EXAM: Primary | ICD-10-CM

## 2019-02-21 DIAGNOSIS — Z71.1 CONCERN ABOUT STD IN FEMALE WITHOUT DIAGNOSIS: ICD-10-CM

## 2019-02-21 PROBLEM — Z30.432 ENCOUNTER FOR IUD REMOVAL: Status: RESOLVED | Noted: 2017-08-15 | Resolved: 2019-02-21

## 2019-02-21 PROBLEM — N89.8 VAGINAL DISCHARGE: Status: ACTIVE | Noted: 2019-02-21

## 2019-02-21 PROCEDURE — 99396 PREV VISIT EST AGE 40-64: CPT | Performed by: OBSTETRICS & GYNECOLOGY

## 2019-02-21 PROCEDURE — 86803 HEPATITIS C AB TEST: CPT

## 2019-02-21 PROCEDURE — 87389 HIV-1 AG W/HIV-1&-2 AB AG IA: CPT

## 2019-02-21 PROCEDURE — 87340 HEPATITIS B SURFACE AG IA: CPT

## 2019-02-21 PROCEDURE — 36415 COLL VENOUS BLD VENIPUNCTURE: CPT

## 2019-02-21 PROCEDURE — 86780 TREPONEMA PALLIDUM: CPT

## 2019-02-21 RX ORDER — NORGESTIMATE AND ETHINYL ESTRADIOL 0.25-0.035
KIT ORAL
Qty: 28 TABLET | Refills: 0 | Status: CANCELLED | OUTPATIENT
Start: 2019-02-21

## 2019-02-21 RX ORDER — NORGESTIMATE AND ETHINYL ESTRADIOL 0.25-0.035
1 KIT ORAL DAILY
Qty: 3 PACKAGE | Refills: 3 | Status: SHIPPED | OUTPATIENT
Start: 2019-02-21 | End: 2019-10-21 | Stop reason: ALTCHOICE

## 2019-02-21 NOTE — TELEPHONE ENCOUNTER
ARMANDO - please advise if ok to generate order for annual mammo (pend).     Last mammo - 09/2015  LOV - 12/08/2017

## 2019-02-21 NOTE — PROGRESS NOTES
HPI:    Patient ID: Luann Powers is a 51year old female. HPI  Well woman visit  Is on oral contraceptives for treatment of menorrhagia. Menstrual cycles have been normal coming monthly lasting 5 days and are light. She requests STD testing.   Maribel Man OR) Take by mouth daily. Disp:  Rfl:    ClonazePAM (KLONOPIN) 0.5 MG Oral Tab Take 0.5 mg by mouth 2 (two) times daily as needed for Anxiety (and a third pill as needed).  Disp:  Rfl:    FLUCONAZOLE 150 MG Oral Tab TAKE 1 TABLET(150 MG) BY MOUTH 1 TIME Ernst Looney HEMORRHOIDECTOMY,INT/EXT,COMPLX     •       per NG   •       per NG   •       per NG   • OTHER SURGICAL HISTORY  2013    Cardiac ablation      Family History   Problem Relation Age of Onset   • Cancer Father         Lung canc and negative in the left inguinal area. Lymphadenopathy:        Right: No inguinal adenopathy present. Left: No inguinal adenopathy present. Breasts:    Symmetric bilaterally. Areolas without lesions.   Skin- normal without growths, lesions, e Antigen      HIV AG AB Combo      TREP      ThinPrep PAP Smear      Chlamydia/GC PCR Combo      Vaginal Culture      Meds This Visit:  Requested Prescriptions      No prescriptions requested or ordered in this encounter       Imaging & Referrals:  None

## 2019-02-21 NOTE — TELEPHONE ENCOUNTER
Pt requesting orders for annual mammogram be added to the chart. Please call back with confirmation once added.

## 2019-02-22 LAB
C TRACH DNA SPEC QL NAA+PROBE: NEGATIVE
HBV SURFACE AG SER-ACNC: <0.1 [IU]/L
HBV SURFACE AG SERPL QL IA: NONREACTIVE
HCV AB SERPL QL IA: NONREACTIVE
HPV I/H RISK 1 DNA SPEC QL NAA+PROBE: POSITIVE
N GONORRHOEA DNA SPEC QL NAA+PROBE: NEGATIVE
T PALLIDUM AB SER QL: NEGATIVE

## 2019-02-23 LAB
GENITAL VAGINOSIS SCREEN: NEGATIVE
TRICHOMONAS SCREEN: NEGATIVE

## 2019-02-24 LAB
HPV16 DNA CVX QL PROBE+SIG AMP: POSITIVE
HPV18 DNA CVX QL PROBE+SIG AMP: NEGATIVE

## 2019-02-26 ENCOUNTER — TELEPHONE (OUTPATIENT)
Dept: OBGYN CLINIC | Facility: CLINIC | Age: 49
End: 2019-02-26

## 2019-02-26 NOTE — TELEPHONE ENCOUNTER
I do not do colposcopy at [facility]. Plenty of space tomorrow afternoon. Please schedule 2:30 or 3 pm colposcopy [facility].

## 2019-02-26 NOTE — TELEPHONE ENCOUNTER
Informed pt of message below-      Notes recorded by Bailee Call MD on 2/26/2019 at 10:57 AM CST  Please inform of Pap test showing cells that are mildly changed from normal (atypical, ASCUS).  The reflex test showed presence of high risk type of hum

## 2019-02-27 ENCOUNTER — OFFICE VISIT (OUTPATIENT)
Dept: OBGYN CLINIC | Facility: CLINIC | Age: 49
End: 2019-02-27
Payer: COMMERCIAL

## 2019-02-27 VITALS — HEART RATE: 110 BPM | SYSTOLIC BLOOD PRESSURE: 138 MMHG | DIASTOLIC BLOOD PRESSURE: 90 MMHG

## 2019-02-27 DIAGNOSIS — Z01.812 PRE-PROCEDURAL LABORATORY EXAMINATION: ICD-10-CM

## 2019-02-27 DIAGNOSIS — Z11.3 ROUTINE SCREENING FOR STI (SEXUALLY TRANSMITTED INFECTION): ICD-10-CM

## 2019-02-27 DIAGNOSIS — R87.610 ASCUS WITH POSITIVE HIGH RISK HPV CERVICAL: Primary | ICD-10-CM

## 2019-02-27 DIAGNOSIS — R87.810 CERVICAL HIGH RISK HUMAN PAPILLOMAVIRUS (HPV) DNA TEST POSITIVE: ICD-10-CM

## 2019-02-27 DIAGNOSIS — R87.810 ASCUS WITH POSITIVE HIGH RISK HPV CERVICAL: Primary | ICD-10-CM

## 2019-02-27 DIAGNOSIS — R87.610 PAPANICOLAOU SMEAR OF CERVIX WITH ATYPICAL SQUAMOUS CELLS OF UNDETERMINED SIGNIFICANCE (ASC-US): ICD-10-CM

## 2019-02-27 PROCEDURE — 57452 EXAM OF CERVIX W/SCOPE: CPT | Performed by: OBSTETRICS & GYNECOLOGY

## 2019-02-27 PROCEDURE — 99211 OFF/OP EST MAY X REQ PHY/QHP: CPT | Performed by: OBSTETRICS & GYNECOLOGY

## 2019-02-27 PROCEDURE — 81025 URINE PREGNANCY TEST: CPT | Performed by: OBSTETRICS & GYNECOLOGY

## 2019-02-27 NOTE — PROCEDURES
Colposcopy     Pregnancy Results: negative from urine test   Birth control method(s) used: ocp    Consent signed. Procedure discussed with patient in detail including indication, risk, benefits, alternatives and complications. Procedure:   The patient w

## 2019-02-27 NOTE — PROGRESS NOTES
HPI:    Patient ID: Smitha Wu is a 51year old female. HPI  Returns for colposcopy  Counseled on results of ASCUS with high risk HPV. Requests herpes testing will order lab. Denies any lesions. Has had negative STD labs recently.   Has a new EACH NOSTRIL TWICE DAILY Disp: 1 Bottle Rfl: 5   baclofen 10 MG Oral Tab Take 1 tablet (10 mg total) by mouth 2 (two) times daily as needed.  Disp: 30 tablet Rfl: 0   ibuprofen 600 MG Oral Tab Take 1 tablet (600 mg total) by mouth every 8 (eight) hours as n

## 2019-10-03 ENCOUNTER — OFFICE VISIT (OUTPATIENT)
Dept: FAMILY MEDICINE CLINIC | Facility: CLINIC | Age: 49
End: 2019-10-03
Payer: COMMERCIAL

## 2019-10-03 VITALS
TEMPERATURE: 98 F | SYSTOLIC BLOOD PRESSURE: 149 MMHG | BODY MASS INDEX: 25.64 KG/M2 | RESPIRATION RATE: 16 BRPM | HEIGHT: 68 IN | DIASTOLIC BLOOD PRESSURE: 85 MMHG | WEIGHT: 169.19 LBS | HEART RATE: 99 BPM | OXYGEN SATURATION: 99 %

## 2019-10-03 DIAGNOSIS — R03.0 ELEVATED BLOOD PRESSURE READING: ICD-10-CM

## 2019-10-03 DIAGNOSIS — R35.0 FREQUENT URINATION: ICD-10-CM

## 2019-10-03 DIAGNOSIS — R35.0 URINARY FREQUENCY: Primary | ICD-10-CM

## 2019-10-03 PROCEDURE — 99202 OFFICE O/P NEW SF 15 MIN: CPT | Performed by: NURSE PRACTITIONER

## 2019-10-03 PROCEDURE — 81025 URINE PREGNANCY TEST: CPT | Performed by: NURSE PRACTITIONER

## 2019-10-03 PROCEDURE — 87086 URINE CULTURE/COLONY COUNT: CPT | Performed by: NURSE PRACTITIONER

## 2019-10-03 PROCEDURE — 81003 URINALYSIS AUTO W/O SCOPE: CPT | Performed by: NURSE PRACTITIONER

## 2019-10-03 RX ORDER — SODIUM FLUORIDE 5 MG/G
GEL, DENTIFRICE DENTAL
COMMUNITY
Start: 2017-05-20 | End: 2021-01-01

## 2019-10-03 RX ORDER — NITROFURANTOIN 25; 75 MG/1; MG/1
100 CAPSULE ORAL 2 TIMES DAILY
Qty: 10 CAPSULE | Refills: 0 | Status: SHIPPED | OUTPATIENT
Start: 2019-10-03 | End: 2019-10-08

## 2019-10-03 RX ORDER — BUPROPION HYDROCHLORIDE 150 MG/1
150 TABLET ORAL DAILY
Refills: 0 | COMMUNITY
Start: 2019-09-24 | End: 2019-10-21

## 2019-10-03 RX ORDER — QUETIAPINE 25 MG/1
50 TABLET, FILM COATED ORAL NIGHTLY
Refills: 1 | COMMUNITY
Start: 2019-09-29 | End: 2019-11-19

## 2019-10-03 RX ORDER — DULOXETIN HYDROCHLORIDE 20 MG/1
CAPSULE, DELAYED RELEASE ORAL
Refills: 0 | COMMUNITY
Start: 2019-08-14 | End: 2019-10-16 | Stop reason: ALTCHOICE

## 2019-10-03 RX ORDER — DEXTROAMPHETAMINE SACCHARATE, AMPHETAMINE ASPARTATE MONOHYDRATE, DEXTROAMPHETAMINE SULFATE AND AMPHETAMINE SULFATE 7.5; 7.5; 7.5; 7.5 MG/1; MG/1; MG/1; MG/1
30 CAPSULE, EXTENDED RELEASE ORAL
COMMUNITY
End: 2019-10-16

## 2019-10-03 RX ORDER — DEXTROAMPHETAMINE SACCHARATE, AMPHETAMINE ASPARTATE MONOHYDRATE, DEXTROAMPHETAMINE SULFATE AND AMPHETAMINE SULFATE 5; 5; 5; 5 MG/1; MG/1; MG/1; MG/1
CAPSULE, EXTENDED RELEASE ORAL
COMMUNITY
Start: 2013-11-13 | End: 2019-10-15

## 2019-10-03 RX ORDER — CLONAZEPAM 1 MG/1
1 TABLET ORAL 3 TIMES DAILY PRN
Refills: 1 | COMMUNITY
Start: 2019-08-14 | End: 2019-11-19

## 2019-10-03 NOTE — PATIENT INSTRUCTIONS
Please hold prescription, if you urine culture is positive you may take the antibiotic as prescribed. We will call you with results. · Complete full course of antibiotics.   · Over the counter Tylenol (acetaminophen) or Advil/Motrin (ibuprofen) can be urine from the kidneys to the bladder. · The bladder holds urine until you are ready to let it out. · The urethra carries urine from the bladder out of the body.  It is shorter in women, so bacteria can move through it more easily. The urethra is longer i attention (report to your doctor or health care professional if they continue or are bothersome):  · dark urine  · diarrhea  · headache  · loss of appetite  · nausea or vomiting  · temporary hair loss  What may interact with this medicine?   · antacids cont

## 2019-10-16 ENCOUNTER — TELEPHONE (OUTPATIENT)
Dept: OBGYN CLINIC | Facility: CLINIC | Age: 49
End: 2019-10-16

## 2019-10-16 NOTE — TELEPHONE ENCOUNTER
10/16/2019 Pt was due for her 6 month f/u pap since August 2019. Due to Ascus pap with Positive HPV from 02/21/2019 & Colposcopy was done on 02/27/2019 and it was negative. Pt hasn't schedule any nicmo. Recall letter was sent on 07/02/2019.        May we send

## 2019-10-18 ENCOUNTER — TELEPHONE (OUTPATIENT)
Dept: INTERNAL MEDICINE CLINIC | Facility: CLINIC | Age: 49
End: 2019-10-18

## 2019-10-18 NOTE — TELEPHONE ENCOUNTER
Per pt she has in ER/[facility] admitted overnight with possible stoke yesterday and would like to come and see Dr Cal Shannon but no available appointment,  Pls advise.     Please reply to pool: RUTH Espinoza

## 2019-10-18 NOTE — TELEPHONE ENCOUNTER
Pt asking to schedule appt with Dr. Cal Shannon. States she was admitted to [facility] yesterday afternoon for stroke-like symptoms. Pt had right sided face drooping, couldn't talk clearly, right eye was not acting correctly, whole right side of body was weaker. Symptoms resolved but pt was kept overnight, discharged today. States she had a work up done and was not given a reason for her symptoms. Pt was discharged today without any restrictions. Pt states she has an autonomic condition that Dr. Cal Shannon knows about. Pt has not been seen in office for two years as she was going through a divorce. Also states she was seeing someone in [facility] but hasn't seen there in two years as well. Pt started a 6 hour program Tuesday 10/15/19 through [facility] for anxiety, depression, and helping her cope with changes related to her condition. No appt available, please advise.

## 2019-10-18 NOTE — TELEPHONE ENCOUNTER
Where would you like to fit pt.  In.  Results are on your desk at [facility]

## 2019-10-18 NOTE — TELEPHONE ENCOUNTER
Per patient she was just released from [facility] and would like to talk to triage.

## 2019-10-22 ENCOUNTER — OFFICE VISIT (OUTPATIENT)
Dept: INTERNAL MEDICINE CLINIC | Facility: CLINIC | Age: 49
End: 2019-10-22
Payer: COMMERCIAL

## 2019-10-22 VITALS
HEART RATE: 92 BPM | HEIGHT: 68 IN | DIASTOLIC BLOOD PRESSURE: 90 MMHG | WEIGHT: 168 LBS | SYSTOLIC BLOOD PRESSURE: 138 MMHG | BODY MASS INDEX: 25.46 KG/M2

## 2019-10-22 DIAGNOSIS — F44.6 CONVERSION DISORDER WITH SENSORY SYMPTOM OR DEFICIT: ICD-10-CM

## 2019-10-22 DIAGNOSIS — F33.1 MODERATE EPISODE OF RECURRENT MAJOR DEPRESSIVE DISORDER (HCC): ICD-10-CM

## 2019-10-22 DIAGNOSIS — I49.8 POTS (POSTURAL ORTHOSTATIC TACHYCARDIA SYNDROME): Primary | ICD-10-CM

## 2019-10-22 DIAGNOSIS — Z00.00 ROUTINE PHYSICAL EXAMINATION: ICD-10-CM

## 2019-10-22 PROCEDURE — 99214 OFFICE O/P EST MOD 30 MIN: CPT | Performed by: INTERNAL MEDICINE

## 2019-10-22 PROCEDURE — 1111F DSCHRG MED/CURRENT MED MERGE: CPT | Performed by: INTERNAL MEDICINE

## 2019-10-22 NOTE — ASSESSMENT & PLAN NOTE
Patient under considerable amount of stress at this time. She was discussing loss of a close work associate, when she felt symptoms of right-sided weakness and speech deficit.   She developed slurring of speech, facial tingling, numbness that extended to h

## 2019-10-22 NOTE — TELEPHONE ENCOUNTER
Talked to patient and she states that she is in treatment the whole day and she will be in at 11:45AM but will not wait like what Dr Norma Dean do due to she's in treatment the whole day.

## 2019-10-22 NOTE — PATIENT INSTRUCTIONS
Problem List Items Addressed This Visit        Unprioritized    Conversion disorder with sensory symptom or deficit     Patient under considerable amount of stress at this time.   She was discussing loss of a close work associate, when she felt symptoms of usually managed with fluid boluses and resuscitation. She will follow-up with [facility], [facility] for further management.            Relevant Orders    CARD ECHO 2D DOPPLER (CPT=93306)      Other Visit Diagnoses     Routine physical examination        Chung Wilburn

## 2019-10-22 NOTE — ASSESSMENT & PLAN NOTE
Blood pressure 138/90, pulse 92, height 5' 8\" (1.727 m), weight 168 lb (76.2 kg), last menstrual period 10/21/2019, not currently breastfeeding. Blood pressure and heart rate seems stable at this time. This has been a long-standing issue.   Her sympto

## 2019-10-22 NOTE — PROGRESS NOTES
HPI:    Patient ID: Tom Rico is a 51year old female. Patient has been lost to follow-up for about 2 years. She placed a call a few days back after her recent admission to [facility] for follow-up after discharge.   She was a The problem has been resolved. Associated symptoms include fatigue. Pertinent negatives include no neck pain, numbness, vomiting or weakness. The symptoms are aggravated by stress. She has tried nothing for the symptoms. The treatment provided no relief. Comment:Paralysis             Unstable vital signs             Other reaction(s): Respiratory Distress             Paralysis             Unstable vital signs  Epinephrine             OTHER (SEE COMMENTS)    Comment:Unstable heart rate, unstable blood press (postural orthostatic tachycardia syndrome) - Primary     Blood pressure 138/90, pulse 92, height 5' 8\" (1.727 m), weight 168 lb (76.2 kg), last menstrual period 10/21/2019, not currently breastfeeding.      Blood pressure and heart rate seems stable at th evaluation to manage complications of pots and evaluate for neurological symptoms of the recent past.             Other Visit Diagnoses     Routine physical examination        Relevant Orders    CBC WITH DIFFERENTIAL WITH PLATELET    COMP METABOLIC PANEL (

## 2019-10-22 NOTE — ASSESSMENT & PLAN NOTE
Patient is in an outpatient program  Multiple stressors at this time. She does have a psychiatrist as well as a counselor. Not in any significant danger to herself at this time.

## 2019-11-30 ENCOUNTER — OFFICE VISIT (OUTPATIENT)
Dept: FAMILY MEDICINE CLINIC | Facility: CLINIC | Age: 49
End: 2019-11-30
Payer: COMMERCIAL

## 2019-11-30 VITALS
DIASTOLIC BLOOD PRESSURE: 74 MMHG | BODY MASS INDEX: 23.49 KG/M2 | OXYGEN SATURATION: 99 % | SYSTOLIC BLOOD PRESSURE: 138 MMHG | HEART RATE: 88 BPM | HEIGHT: 68 IN | WEIGHT: 155 LBS | TEMPERATURE: 99 F

## 2019-11-30 DIAGNOSIS — L23.1 CONTACT DERMATITIS DUE TO ADHESIVES, UNSPECIFIED CONTACT DERMATITIS TYPE: Primary | ICD-10-CM

## 2019-11-30 PROCEDURE — 99213 OFFICE O/P EST LOW 20 MIN: CPT

## 2019-11-30 NOTE — PROGRESS NOTES
Laurie Platt is a 51year old femalewho presents with Patient presents with:  Rash: small rash under right breast, redness in area x 4 days     HPI:   Reports 4 day history of redness and bumps located under right breast since having a reusable pad ASSESSMENT AND PLAN:   Dannielle Mistry is a 51year old female who presents with Patient presents with:  Rash: small rash under right breast, redness in area x 4 days       ASSESSMENT:  Contact dermatitis due to adhesives, unspecified contact dermatiti * Use Hibiclens to area in case of MRSA contamination, see bottle for instructions. Use thin layer of Bactroban to rash and inside both sides of your nose for the next week. , see label for instructions.   Home care  Your healthcare provider may prescribe med · If a plant causes your rash, make sure to wash your skin and the clothes you were wearing when you came into contact with the plant. This is to wash away the plant oils that gave you the rash and prevent more or worse symptoms.   · Stay away from the [address]

## 2019-12-01 NOTE — PATIENT INSTRUCTIONS
Contact Dermatitis  Contact dermatitis is a skin rash caused by something that touches the skin and makes it irritated and inflamed. Your skin may be red, swollen, dry, and may be cracked. Blisters may form and ooze. The rash will itch.   Contact dermatit · Apply cold compresses to soothe your sores to help relieve your symptoms. Do this for 30 minutes 3 to 4 times a day. You can make a cold compress by soaking a cloth in cold water. Squeeze out excess water.  You can add colloidal oatmeal to the water to he · Severe swelling of your face, eyelids, mouth, throat or tongue  · Trouble urinating due to swelling in the genital area  · Fever of 100.4°F (38°C) or higher  · Redness or swelling that gets worse  · Pain that gets worse  · Foul-smelling fluid leaking fro

## 2019-12-02 ENCOUNTER — LAB ENCOUNTER (OUTPATIENT)
Dept: LAB | Age: 49
End: 2019-12-02
Attending: INTERNAL MEDICINE
Payer: COMMERCIAL

## 2019-12-02 ENCOUNTER — TELEPHONE (OUTPATIENT)
Dept: FAMILY MEDICINE CLINIC | Facility: CLINIC | Age: 49
End: 2019-12-02

## 2019-12-02 DIAGNOSIS — Z00.00 ROUTINE PHYSICAL EXAMINATION: ICD-10-CM

## 2019-12-02 PROCEDURE — 81003 URINALYSIS AUTO W/O SCOPE: CPT

## 2019-12-02 PROCEDURE — 82306 VITAMIN D 25 HYDROXY: CPT

## 2019-12-02 PROCEDURE — 82607 VITAMIN B-12: CPT

## 2019-12-02 PROCEDURE — 80061 LIPID PANEL: CPT

## 2019-12-02 PROCEDURE — 80053 COMPREHEN METABOLIC PANEL: CPT

## 2019-12-02 PROCEDURE — 36415 COLL VENOUS BLD VENIPUNCTURE: CPT

## 2019-12-02 PROCEDURE — 85025 COMPLETE CBC W/AUTO DIFF WBC: CPT

## 2019-12-02 PROCEDURE — 84443 ASSAY THYROID STIM HORMONE: CPT

## 2019-12-02 NOTE — TELEPHONE ENCOUNTER
Pt was calling back as a follow up. States that many of the red bumps and rash have lessened or disappeared. The scabbed areas under right breast are not tender, not more reddened and not draining, but the scabs appear about 1-2mm bigger.  Pt denies fever o

## 2019-12-04 ENCOUNTER — TELEPHONE (OUTPATIENT)
Dept: FAMILY MEDICINE CLINIC | Facility: CLINIC | Age: 49
End: 2019-12-04

## 2019-12-05 NOTE — TELEPHONE ENCOUNTER
Follow up call to see that symptoms are resolving slowly as expected.  Message left with call center number Instructed that if not improving, needs to see PCP

## 2020-01-01 RX ORDER — NORGESTIMATE AND ETHINYL ESTRADIOL
KIT
Qty: 84 TABLET | Refills: 1 | Status: SHIPPED | OUTPATIENT
Start: 2020-01-01 | End: 2021-01-01

## 2020-01-06 ENCOUNTER — TELEPHONE (OUTPATIENT)
Dept: OBGYN CLINIC | Facility: CLINIC | Age: 50
End: 2020-01-06

## 2020-01-06 ENCOUNTER — OFFICE VISIT (OUTPATIENT)
Dept: OBGYN CLINIC | Facility: CLINIC | Age: 50
End: 2020-01-06
Payer: COMMERCIAL

## 2020-01-06 VITALS — WEIGHT: 179 LBS | SYSTOLIC BLOOD PRESSURE: 132 MMHG | DIASTOLIC BLOOD PRESSURE: 86 MMHG | BODY MASS INDEX: 27 KG/M2

## 2020-01-06 DIAGNOSIS — R87.610 ATYPICAL SQUAMOUS CELL CHANGES OF UNDETERMINED SIGNIFICANCE (ASCUS) ON CERVICAL CYTOLOGY WITH POSITIVE HIGH RISK HUMAN PAPILLOMA VIRUS (HPV): Primary | ICD-10-CM

## 2020-01-06 DIAGNOSIS — R87.810 ATYPICAL SQUAMOUS CELL CHANGES OF UNDETERMINED SIGNIFICANCE (ASCUS) ON CERVICAL CYTOLOGY WITH POSITIVE HIGH RISK HUMAN PAPILLOMA VIRUS (HPV): Primary | ICD-10-CM

## 2020-01-06 PROCEDURE — 99213 OFFICE O/P EST LOW 20 MIN: CPT | Performed by: OBSTETRICS & GYNECOLOGY

## 2020-01-06 RX ORDER — NORGESTIMATE AND ETHINYL ESTRADIOL 0.25-0.035
KIT ORAL
COMMUNITY
Start: 2019-12-16 | End: 2020-01-09

## 2020-01-06 NOTE — TELEPHONE ENCOUNTER
RN placed call to patient. RN advises pt of [facility] recommendation that she discontinue OCP. Pt verbalizes understanding.   Pt asks that RN advise [facility] that her other healthcare provider believed that her elevated Bps and subsequent TIA like episode were related

## 2020-01-06 NOTE — PROGRESS NOTES
HPI:    Patient ID: Dannielle Mistry is a 51year old year old female. HPI  GYN visit  Returns for Pap tests  History of ASCUS Pap with positive high risk HPV type XVI. No gynecologic complaints.   Was receiving treatment for extreme anxiety and suff Vagina- normal, no lesions or discharge. Moist and well supported. Bladder-  nontender. No masses. Normal support. No evidence of cystocele,  abnormal bladder neck mobility or evident urinary incontinence.   Cervix- smooth, normal epithelium without DIRECTED, Disp: , Rfl:   PROCTOZONE-HC 2.5 % Rectal Cream, APPLY AS DIRECTED, Disp: 30 g, Rfl: 0  BuPROPion HCl ER, XL, 300 MG Oral Tablet 24 Hr, Take 300 mg by mouth daily. , Disp: , Rfl:   Clindamycin Phos-Benzoyl Perox 1-5 % External Gel, Apply topically

## 2020-01-06 NOTE — TELEPHONE ENCOUNTER
Please inform patient that I would like her to stop taking OCPs as she described having a recent possible TIA. I tried catching the patient before she left.

## 2020-01-07 LAB — HPV I/H RISK 1 DNA SPEC QL NAA+PROBE: POSITIVE

## 2020-01-09 ENCOUNTER — TELEPHONE (OUTPATIENT)
Dept: OBGYN CLINIC | Facility: CLINIC | Age: 50
End: 2020-01-09

## 2020-01-09 LAB
HPV16 DNA CVX QL PROBE+SIG AMP: NEGATIVE
HPV18 DNA CVX QL PROBE+SIG AMP: NEGATIVE

## 2020-01-09 RX ORDER — NORGESTIMATE AND ETHINYL ESTRADIOL 0.25-0.035
1 KIT ORAL DAILY
Qty: 1 PACKAGE | Refills: 0 | Status: SHIPPED | OUTPATIENT
Start: 2020-01-09 | End: 2020-02-06

## 2020-01-09 NOTE — TELEPHONE ENCOUNTER
Pt would like to have 1 month refill on current BC, pt didn't have a stroke or TIA had conversion disorder, pt has apt with Neurologist

## 2020-01-09 NOTE — TELEPHONE ENCOUNTER
Approve one month refill of Estarylla ocp. I would like the approval of her neurologist that it is safe to continue her OCP.

## 2020-01-11 ENCOUNTER — TELEPHONE (OUTPATIENT)
Dept: OBGYN CLINIC | Facility: CLINIC | Age: 50
End: 2020-01-11

## 2020-01-11 NOTE — TELEPHONE ENCOUNTER
Pt called and informed of results and provider's recommendations below. Pt voices understanding. Pt placed in follow up book.     ----- Message from Miguel Lara MD sent at 1/10/2020  3:56 PM CST -----  Please inform of Pap test showing cells that are m

## 2020-01-13 NOTE — TELEPHONE ENCOUNTER
Ldmtcb per FYI: pt needs to come in for consult to discuss bc options per [facility]

## 2020-01-17 ENCOUNTER — PATIENT MESSAGE (OUTPATIENT)
Dept: INTERNAL MEDICINE CLINIC | Facility: CLINIC | Age: 50
End: 2020-01-17

## 2020-01-17 NOTE — TELEPHONE ENCOUNTER
From: Mendy Mc  To: Santa Gaines MD  Sent: 2020 10:11 AM CST  Subject: Other    I need a new order for a mammogram. I have had previous orders that . I would like to schedule a long overdue mammogram on .  The same day I

## 2020-02-03 ENCOUNTER — HOSPITAL ENCOUNTER (OUTPATIENT)
Age: 50
Discharge: HOME | End: 2020-02-03
Attending: FAMILY MEDICINE
Payer: COMMERCIAL

## 2020-02-03 VITALS
SYSTOLIC BLOOD PRESSURE: 134 MMHG | DIASTOLIC BLOOD PRESSURE: 69 MMHG | HEART RATE: 80 BPM | BODY MASS INDEX: 25 KG/M2 | TEMPERATURE: 99 F | RESPIRATION RATE: 16 BRPM | WEIGHT: 165 LBS | OXYGEN SATURATION: 100 %

## 2020-02-03 DIAGNOSIS — I88.9 LYMPHADENITIS: Primary | ICD-10-CM

## 2020-02-03 LAB — S PYO AG THROAT QL: NEGATIVE

## 2020-02-03 PROCEDURE — 87430 STREP A AG IA: CPT

## 2020-02-03 PROCEDURE — 99214 OFFICE O/P EST MOD 30 MIN: CPT

## 2020-02-03 PROCEDURE — 99213 OFFICE O/P EST LOW 20 MIN: CPT

## 2020-02-03 RX ORDER — AMOXICILLIN AND CLAVULANATE POTASSIUM 875; 125 MG/1; MG/1
1 TABLET, FILM COATED ORAL 2 TIMES DAILY
Qty: 20 TABLET | Refills: 0 | Status: SHIPPED | OUTPATIENT
Start: 2020-02-03 | End: 2020-02-13

## 2020-02-03 RX ORDER — FLUCONAZOLE 150 MG/1
150 TABLET ORAL ONCE
Qty: 1 TABLET | Refills: 0 | Status: SHIPPED | OUTPATIENT
Start: 2020-02-03 | End: 2020-02-03

## 2020-02-03 NOTE — ED PROVIDER NOTES
Patient Seen in: [address]    History   Patient presents with:  Sore Throat    Stated Complaint: sore throat    HPI    Patient here with sore throat and right swollen gland pain for 4 days. Pt has nasal congestion.    No by mouth daily. Clindamycin Phos-Benzoyl Perox 1-5 % External Gel,  Apply topically daily. NON FORMULARY,  Potassium, magnesium, and sodium tablet which pt adds to water. Multiple Vitamins-Minerals (MULTIVITAMIN & MINERAL OR),  Take by mouth daily. bilateral, conjunctiva clear  EARS:TM's clear bilateral  NOSE: nasal congestion  THROAT: mild erythema  LUNGS: clear no resp distress, lungs clear bilateral  SKIN: good skin turgor, no obvious rashes  NECK: supple, right tender and swollen gland  CARDIO: R

## 2020-02-11 RX ORDER — NORGESTIMATE AND ETHINYL ESTRADIOL 0.25-0.035
KIT ORAL
Qty: 28 TABLET | Refills: 0 | OUTPATIENT
Start: 2020-02-11

## 2020-02-15 ENCOUNTER — TELEPHONE (OUTPATIENT)
Dept: OBGYN CLINIC | Facility: CLINIC | Age: 50
End: 2020-02-15

## 2020-02-17 NOTE — TELEPHONE ENCOUNTER
Patience Anderson. I see what you mean about the price of the pill. The pill that you have used is considered a low dose pill but there are even lower dose options as well. Lo Loestrin is the very lowest available.   We can discuss further at your visit tomorrow

## 2020-02-18 NOTE — TELEPHONE ENCOUNTER
Pt Name and  verified. Pt states that she has been discussing bc options. States that she was advised to discuss with neurologist about bc that is suitable for her. However, the medication that was prescribed for her was not covered by insurance.      P

## 2020-02-24 ENCOUNTER — TELEPHONE (OUTPATIENT)
Dept: OBGYN CLINIC | Facility: CLINIC | Age: 50
End: 2020-02-24

## 2020-02-24 RX ORDER — NORGESTIMATE AND ETHINYL ESTRADIOL 7DAYSX3 LO
1 KIT ORAL DAILY
Qty: 3 PACKAGE | Refills: 1 | Status: SHIPPED | OUTPATIENT
Start: 2020-02-24 | End: 2020-08-03

## 2020-02-24 NOTE — TELEPHONE ENCOUNTER
----- Message from Tanisha Orosco RN sent at 2/24/2020 11:32 AM CST -----  Regarding: FW: Prescription Question  Contact: [phone]    ----- Message -----  From: Saul Cuenca  Sent: 2/24/2020  11:19 AM CST  To: [facility] Clinical Staff  Subject: RE: Prescription Question                        Hi Dr Rolando Wallace. Yes, please proscribe the low dose BC that you have recommended. I hope it also helps with acne and with period volume. Please advise when I should start the prescription, as I have been off all BC for the past 8 days. Thank you    Poncho Chris  ----- Message -----  From: Danita Jon MD  Sent: 2/22/2020 11:04 AM CST  To: Saul Cuenca  Subject: RE: Prescription Question  Constantino Elizondoian,  I think that given your recent neurologic problems that it would be safer for you to be on an OCP with a lower estrogen dose. The neurologist also agrees with this. Today low dose OCP is considered 30 mcg or lower. Estarylla is 35 mcg. A good choice I would recommend that is in Estarylla's \"family\" is Tri Lo Estarylla which is 25 mcg of estrogen and Tier 1 so hopefully covered and reasonably priced with your insurance. BP elevation is not the only concern with OCPs, it is their effect on the blood vessels and potential for thrombosis. Lower estrogen doses are much safer for you than higher. I don't think continuing on this higher dose OCP is wise for you. Let me know what you think.      ----- Message -----     From: Saul Cuenca     Sent: 2/21/2020  4:55 PM CST       To: Danita Jon MD  Subject: RE: Prescription Question    Thanks for your reply, Dr Rolando Wallace. In an ideal world, I would be happy to make the change in [facility]. I take my BP very seriously and I have to weigh the benefits and the risks of each medication I take. I need my BC to control both my period and my acne. I truly believe that weight gain in the last 18 months is the biggest contributor the increase in BP.  I am committed to losing this weight. Since October, I am at the gym 5 days a week for at least 2 hours at a time. Plus, I work with a  3 times a week. I want the weight off to see if I see my BP improves. So, I am asking that I stay on [place] for at least 6 more months (when I have my next appointment with you) to see if there is improvement with my BP. Sound okay?  ----- Message -----  From: Cassy Oscar MD  Sent: 2/21/2020  2:22 PM CST  To: Ciera Post  Subject: RE: Prescription Question  Tal Ridge,  If that is the way you want to go, I will prescribe it for you. There are two pills with lower (20 mcg) estrogen than yours and are Tier 1- Lutera and Orsythia. You might want to consider them. Estarylla has the edge in improving acne, however. Let me know either way.      ----- Message -----     From: Ciera Post     Sent: 2/21/2020  1:34 PM CST       To: Cassy Oscar MD  Subject: RE: Prescription Question    I wanted you to know that Lo Loestrin FE is very expensive because it it is a Tier 3 drug with no generic options. BC[employer] said Lisa (20mcg estrogen) or Wandy (30 mcg estrogen) was comparable. My current birth control pill is estarylla has 25mcg estrogen. Can I please stay on this pill? It controls the bleeding during my periods well and it controls my acne very well. I spoke to my psychiatrist 2 weeks ago about my medications and blood pressure. She had me stop taking 2 medications (Wellbutrin and Trentelix) to reduce the impact of these meds on my BP. I am at the gym 5 days a week to lose weight and gain muscle mass. I eat well. Can I pleased stay on Estraylla?

## 2020-05-28 ENCOUNTER — TELEPHONE (OUTPATIENT)
Dept: OBGYN CLINIC | Facility: CLINIC | Age: 50
End: 2020-05-28

## 2020-07-16 ENCOUNTER — HOSPITAL ENCOUNTER (OUTPATIENT)
Age: 50
Discharge: HOME | End: 2020-07-16
Attending: EMERGENCY MEDICINE
Payer: COMMERCIAL

## 2020-07-16 VITALS
HEART RATE: 109 BPM | TEMPERATURE: 98 F | WEIGHT: 175 LBS | HEIGHT: 68 IN | SYSTOLIC BLOOD PRESSURE: 166 MMHG | OXYGEN SATURATION: 98 % | DIASTOLIC BLOOD PRESSURE: 83 MMHG | RESPIRATION RATE: 18 BRPM | BODY MASS INDEX: 26.52 KG/M2

## 2020-07-16 DIAGNOSIS — U07.1 COVID-19: Primary | ICD-10-CM

## 2020-07-16 PROCEDURE — 99202 OFFICE O/P NEW SF 15 MIN: CPT | Performed by: EMERGENCY MEDICINE

## 2020-07-16 PROCEDURE — U0003 INFECTIOUS AGENT DETECTION BY NUCLEIC ACID (DNA OR RNA); SEVERE ACUTE RESPIRATORY SYNDROME CORONAVIRUS 2 (SARS-COV-2) (CORONAVIRUS DISEASE [COVID-19]), AMPLIFIED PROBE TECHNIQUE, MAKING USE OF HIGH THROUGHPUT TECHNOLOGIES AS DESCRIBED BY CMS-2020-01-R: HCPCS | Performed by: EMERGENCY MEDICINE

## 2020-07-16 NOTE — ED PROVIDER NOTES
Patient Seen in: [facility] In [address] [place] [address]    History   Patient presents with:  Testing    Stated Complaint: TL- cough, recent travel     HPI    Patient complains of cough and recent arrival would like to get tested for COVID-19. Ewa HERNANDES cancer (smoker); per NG   • Breast Cancer Mother         per NG   • Kidney Disease Mother         Kent's disease; per NG   • Other (Other) Mother    • Cancer Paternal Aunt         Lung cancer (smoker); per NG   • Cancer Paternal Aunt         Lung cancer Last 1 Encounters:  07/16/20 : 109  , ,      Radiology findings:       Procedures:      Critical Care:        MDM                   Disposition and Plan     Clinical Impression:  [id number]  (primary encounter diagnosis)    Disposition:  Discharge  7/16/2020

## 2020-07-17 LAB — SARS-COV-2 RNA RESP QL NAA+PROBE: NOT DETECTED

## 2020-08-03 RX ORDER — NORGESTIMATE AND ETHINYL ESTRADIOL
KIT
Qty: 84 TABLET | Refills: 1 | Status: SHIPPED | OUTPATIENT
Start: 2020-08-03 | End: 2020-01-01

## 2020-08-21 ENCOUNTER — TELEPHONE (OUTPATIENT)
Dept: INTERNAL MEDICINE CLINIC | Facility: CLINIC | Age: 50
End: 2020-08-21

## 2020-08-21 ENCOUNTER — PATIENT MESSAGE (OUTPATIENT)
Dept: INTERNAL MEDICINE CLINIC | Facility: CLINIC | Age: 50
End: 2020-08-21

## 2020-08-21 DIAGNOSIS — Z20.822 ENCOUNTER FOR SCREENING LABORATORY TESTING FOR COVID-19 VIRUS IN ASYMPTOMATIC PATIENT: Primary | ICD-10-CM

## 2020-08-21 NOTE — TELEPHONE ENCOUNTER
Please see patient MyChart message below. She is required to have a negative Covid test in order to take her child to college.  Order pended for your review/approval.

## 2020-08-21 NOTE — TELEPHONE ENCOUNTER
From: Crescencio Daniel  To: Ronnie Lezama MD  Sent: 8/21/2020 11:38 AM CDT  Subject: Other    I am taking my daughter to college in [place], Tuesday morning. I have to present a negative Covid test when we arrive August 25th.  I have been in quarantine f

## 2020-08-21 NOTE — TELEPHONE ENCOUNTER
----- Message from Param Dietz sent at 8/21/2020 11:38 AM CDT -----  Regarding: Other  Contact: [phone]  I am taking my daughter to college in [place], Tuesday morning. I have to present a negative Covid test when we arrive August 25th.  I hav

## 2020-08-22 ENCOUNTER — LAB ENCOUNTER (OUTPATIENT)
Dept: LAB | Facility: HOSPITAL | Age: 50
End: 2020-08-22
Attending: INTERNAL MEDICINE
Payer: COMMERCIAL

## 2020-08-22 DIAGNOSIS — Z20.822 ENCOUNTER FOR SCREENING LABORATORY TESTING FOR COVID-19 VIRUS IN ASYMPTOMATIC PATIENT: ICD-10-CM

## 2020-08-26 LAB — SARS-COV-2 BY PCR: NOT DETECTED

## 2021-01-01 ENCOUNTER — APPOINTMENT (OUTPATIENT)
Dept: CV DIAGNOSTICS | Facility: HOSPITAL | Age: 51
DRG: 215 | End: 2021-01-01
Attending: INTERNAL MEDICINE
Payer: COMMERCIAL

## 2021-01-01 ENCOUNTER — APPOINTMENT (OUTPATIENT)
Dept: GENERAL RADIOLOGY | Facility: HOSPITAL | Age: 51
DRG: 215 | End: 2021-01-01
Attending: INTERNAL MEDICINE
Payer: COMMERCIAL

## 2021-01-01 ENCOUNTER — APPOINTMENT (OUTPATIENT)
Dept: INTERVENTIONAL RADIOLOGY/VASCULAR | Facility: HOSPITAL | Age: 51
DRG: 215 | End: 2021-01-01
Attending: INTERNAL MEDICINE
Payer: COMMERCIAL

## 2021-01-01 ENCOUNTER — LAB ENCOUNTER (OUTPATIENT)
Dept: LAB | Age: 51
End: 2021-01-01
Attending: INTERNAL MEDICINE
Payer: COMMERCIAL

## 2021-01-01 ENCOUNTER — HOSPITAL ENCOUNTER (OUTPATIENT)
Dept: MAMMOGRAPHY | Facility: HOSPITAL | Age: 51
Discharge: HOME | End: 2021-01-01
Attending: INTERNAL MEDICINE
Payer: COMMERCIAL

## 2021-01-01 ENCOUNTER — APPOINTMENT (OUTPATIENT)
Dept: CT IMAGING | Facility: HOSPITAL | Age: 51
DRG: 215 | End: 2021-01-01
Attending: Other
Payer: COMMERCIAL

## 2021-01-01 ENCOUNTER — APPOINTMENT (OUTPATIENT)
Dept: CT IMAGING | Facility: HOSPITAL | Age: 51
DRG: 215 | End: 2021-01-01
Attending: INTERNAL MEDICINE
Payer: COMMERCIAL

## 2021-01-01 ENCOUNTER — OFFICE VISIT (OUTPATIENT)
Dept: GASTROENTEROLOGY | Facility: CLINIC | Age: 51
End: 2021-01-01
Payer: COMMERCIAL

## 2021-01-01 ENCOUNTER — HOSPITAL ENCOUNTER (OUTPATIENT)
Dept: CV DIAGNOSTICS | Facility: HOSPITAL | Age: 51
Discharge: HOME | End: 2021-01-01
Attending: INTERNAL MEDICINE
Payer: COMMERCIAL

## 2021-01-01 ENCOUNTER — HOSPITAL ENCOUNTER (INPATIENT)
Facility: HOSPITAL | Age: 51
LOS: 4 days | DRG: 215 | End: 2021-01-01
Attending: EMERGENCY MEDICINE | Admitting: INTERNAL MEDICINE
Payer: COMMERCIAL

## 2021-01-01 ENCOUNTER — OFFICE VISIT (OUTPATIENT)
Dept: INTERNAL MEDICINE CLINIC | Facility: CLINIC | Age: 51
End: 2021-01-01
Payer: COMMERCIAL

## 2021-01-01 ENCOUNTER — HOSPITAL ENCOUNTER (OUTPATIENT)
Age: 51
Discharge: HOME | End: 2021-01-01
Payer: COMMERCIAL

## 2021-01-01 ENCOUNTER — APPOINTMENT (OUTPATIENT)
Dept: GENERAL RADIOLOGY | Facility: HOSPITAL | Age: 51
DRG: 215 | End: 2021-01-01
Payer: COMMERCIAL

## 2021-01-01 VITALS
SYSTOLIC BLOOD PRESSURE: 122 MMHG | WEIGHT: 175 LBS | HEART RATE: 98 BPM | BODY MASS INDEX: 26.52 KG/M2 | HEIGHT: 68 IN | DIASTOLIC BLOOD PRESSURE: 86 MMHG

## 2021-01-01 VITALS
OXYGEN SATURATION: 99 % | HEIGHT: 68 IN | BODY MASS INDEX: 25.76 KG/M2 | TEMPERATURE: 98 F | SYSTOLIC BLOOD PRESSURE: 132 MMHG | HEART RATE: 86 BPM | RESPIRATION RATE: 18 BRPM | WEIGHT: 170 LBS | DIASTOLIC BLOOD PRESSURE: 76 MMHG

## 2021-01-01 VITALS
RESPIRATION RATE: 16 BRPM | TEMPERATURE: 98 F | SYSTOLIC BLOOD PRESSURE: 124 MMHG | HEIGHT: 68 IN | DIASTOLIC BLOOD PRESSURE: 82 MMHG | BODY MASS INDEX: 26.37 KG/M2 | HEART RATE: 102 BPM | WEIGHT: 174 LBS

## 2021-01-01 VITALS
WEIGHT: 227.31 LBS | DIASTOLIC BLOOD PRESSURE: 39 MMHG | HEIGHT: 68 IN | SYSTOLIC BLOOD PRESSURE: 51 MMHG | OXYGEN SATURATION: 99 % | BODY MASS INDEX: 34.45 KG/M2 | TEMPERATURE: 97 F

## 2021-01-01 DIAGNOSIS — R54 ADVANCED AGE: ICD-10-CM

## 2021-01-01 DIAGNOSIS — Z00.00 ROUTINE PHYSICAL EXAMINATION: Primary | ICD-10-CM

## 2021-01-01 DIAGNOSIS — R87.610 ATYPICAL SQUAMOUS CELL CHANGES OF UNDETERMINED SIGNIFICANCE (ASCUS) ON CERVICAL CYTOLOGY WITH POSITIVE HIGH RISK HUMAN PAPILLOMA VIRUS (HPV): ICD-10-CM

## 2021-01-01 DIAGNOSIS — Z12.11 COLON CANCER SCREENING: ICD-10-CM

## 2021-01-01 DIAGNOSIS — R54 ADVANCED AGE: Primary | ICD-10-CM

## 2021-01-01 DIAGNOSIS — J96.00 ACUTE RESPIRATORY FAILURE, UNSPECIFIED WHETHER WITH HYPOXIA OR HYPERCAPNIA (HCC): ICD-10-CM

## 2021-01-01 DIAGNOSIS — M62.89 PELVIC FLOOR DYSFUNCTION: ICD-10-CM

## 2021-01-01 DIAGNOSIS — Z20.822 ENCOUNTER FOR LABORATORY TESTING FOR COVID-19 VIRUS: Primary | ICD-10-CM

## 2021-01-01 DIAGNOSIS — R53.83 OTHER FATIGUE: ICD-10-CM

## 2021-01-01 DIAGNOSIS — I49.8 POTS (POSTURAL ORTHOSTATIC TACHYCARDIA SYNDROME): ICD-10-CM

## 2021-01-01 DIAGNOSIS — R87.810 ATYPICAL SQUAMOUS CELL CHANGES OF UNDETERMINED SIGNIFICANCE (ASCUS) ON CERVICAL CYTOLOGY WITH POSITIVE HIGH RISK HUMAN PAPILLOMA VIRUS (HPV): ICD-10-CM

## 2021-01-01 DIAGNOSIS — I46.9 CARDIAC ARREST (HCC): Primary | ICD-10-CM

## 2021-01-01 DIAGNOSIS — Z12.31 SCREENING MAMMOGRAM, ENCOUNTER FOR: ICD-10-CM

## 2021-01-01 DIAGNOSIS — E78.2 MIXED HYPERLIPIDEMIA: ICD-10-CM

## 2021-01-01 DIAGNOSIS — F33.1 MODERATE EPISODE OF RECURRENT MAJOR DEPRESSIVE DISORDER (HCC): ICD-10-CM

## 2021-01-01 DIAGNOSIS — Z00.00 ROUTINE PHYSICAL EXAMINATION: ICD-10-CM

## 2021-01-01 DIAGNOSIS — K59.09 CHRONIC CONSTIPATION: Primary | ICD-10-CM

## 2021-01-01 LAB — SARS-COV-2 RNA,QUAL, RT-PCR: NOT DETECTED

## 2021-01-01 PROCEDURE — 77063 BREAST TOMOSYNTHESIS BI: CPT | Performed by: INTERNAL MEDICINE

## 2021-01-01 PROCEDURE — 3079F DIAST BP 80-89 MM HG: CPT | Performed by: INTERNAL MEDICINE

## 2021-01-01 PROCEDURE — 81003 URINALYSIS AUTO W/O SCOPE: CPT

## 2021-01-01 PROCEDURE — 3008F BODY MASS INDEX DOCD: CPT | Performed by: INTERNAL MEDICINE

## 2021-01-01 PROCEDURE — 99232 SBSQ HOSP IP/OBS MODERATE 35: CPT | Performed by: INTERNAL MEDICINE

## 2021-01-01 PROCEDURE — 95720 EEG PHY/QHP EA INCR W/VEEG: CPT | Performed by: OTHER

## 2021-01-01 PROCEDURE — 99252 IP/OBS CONSLTJ NEW/EST SF 35: CPT | Performed by: INTERNAL MEDICINE

## 2021-01-01 PROCEDURE — 36415 COLL VENOUS BLD VENIPUNCTURE: CPT

## 2021-01-01 PROCEDURE — 93308 TTE F-UP OR LMTD: CPT | Performed by: INTERNAL MEDICINE

## 2021-01-01 PROCEDURE — 03HY32Z INSERTION OF MONITORING DEVICE INTO UPPER ARTERY, PERCUTANEOUS APPROACH: ICD-10-PCS | Performed by: INTERNAL MEDICINE

## 2021-01-01 PROCEDURE — 84439 ASSAY OF FREE THYROXINE: CPT

## 2021-01-01 PROCEDURE — 74176 CT ABD & PELVIS W/O CONTRAST: CPT | Performed by: INTERNAL MEDICINE

## 2021-01-01 PROCEDURE — 93307 TTE W/O DOPPLER COMPLETE: CPT | Performed by: INTERNAL MEDICINE

## 2021-01-01 PROCEDURE — 99396 PREV VISIT EST AGE 40-64: CPT | Performed by: INTERNAL MEDICINE

## 2021-01-01 PROCEDURE — 0BH17EZ INSERTION OF ENDOTRACHEAL AIRWAY INTO TRACHEA, VIA NATURAL OR ARTIFICIAL OPENING: ICD-10-PCS | Performed by: INTERNAL MEDICINE

## 2021-01-01 PROCEDURE — 71045 X-RAY EXAM CHEST 1 VIEW: CPT | Performed by: INTERNAL MEDICINE

## 2021-01-01 PROCEDURE — 99291 CRITICAL CARE FIRST HOUR: CPT | Performed by: INTERNAL MEDICINE

## 2021-01-01 PROCEDURE — 82306 VITAMIN D 25 HYDROXY: CPT

## 2021-01-01 PROCEDURE — 99232 SBSQ HOSP IP/OBS MODERATE 35: CPT | Performed by: OTHER

## 2021-01-01 PROCEDURE — 5A0221D ASSISTANCE WITH CARDIAC OUTPUT USING IMPELLER PUMP, CONTINUOUS: ICD-10-PCS | Performed by: INTERNAL MEDICINE

## 2021-01-01 PROCEDURE — 02HA3RZ INSERTION OF SHORT-TERM EXTERNAL HEART ASSIST SYSTEM INTO HEART, PERCUTANEOUS APPROACH: ICD-10-PCS | Performed by: INTERNAL MEDICINE

## 2021-01-01 PROCEDURE — 3074F SYST BP LT 130 MM HG: CPT | Performed by: INTERNAL MEDICINE

## 2021-01-01 PROCEDURE — 85025 COMPLETE CBC W/AUTO DIFF WBC: CPT

## 2021-01-01 PROCEDURE — 83001 ASSAY OF GONADOTROPIN (FSH): CPT

## 2021-01-01 PROCEDURE — 99212 OFFICE O/P EST SF 10 MIN: CPT | Performed by: NURSE PRACTITIONER

## 2021-01-01 PROCEDURE — 99233 SBSQ HOSP IP/OBS HIGH 50: CPT | Performed by: INTERNAL MEDICINE

## 2021-01-01 PROCEDURE — 5A1955Z RESPIRATORY VENTILATION, GREATER THAN 96 CONSECUTIVE HOURS: ICD-10-PCS | Performed by: INTERNAL MEDICINE

## 2021-01-01 PROCEDURE — 77067 SCR MAMMO BI INCL CAD: CPT | Performed by: INTERNAL MEDICINE

## 2021-01-01 PROCEDURE — B4101ZZ FLUOROSCOPY OF ABDOMINAL AORTA USING LOW OSMOLAR CONTRAST: ICD-10-PCS | Performed by: INTERNAL MEDICINE

## 2021-01-01 PROCEDURE — 02PA3RZ REMOVAL OF SHORT-TERM EXTERNAL HEART ASSIST SYSTEM FROM HEART, PERCUTANEOUS APPROACH: ICD-10-PCS | Performed by: INTERNAL MEDICINE

## 2021-01-01 PROCEDURE — 70450 CT HEAD/BRAIN W/O DYE: CPT | Performed by: OTHER

## 2021-01-01 PROCEDURE — B2111ZZ FLUOROSCOPY OF MULTIPLE CORONARY ARTERIES USING LOW OSMOLAR CONTRAST: ICD-10-PCS | Performed by: INTERNAL MEDICINE

## 2021-01-01 PROCEDURE — 82607 VITAMIN B-12: CPT

## 2021-01-01 PROCEDURE — 06HN33Z INSERTION OF INFUSION DEVICE INTO LEFT FEMORAL VEIN, PERCUTANEOUS APPROACH: ICD-10-PCS | Performed by: INTERNAL MEDICINE

## 2021-01-01 PROCEDURE — 30233R1 TRANSFUSION OF NONAUTOLOGOUS PLATELETS INTO PERIPHERAL VEIN, PERCUTANEOUS APPROACH: ICD-10-PCS | Performed by: INTERNAL MEDICINE

## 2021-01-01 PROCEDURE — 93306 TTE W/DOPPLER COMPLETE: CPT | Performed by: INTERNAL MEDICINE

## 2021-01-01 PROCEDURE — 4A023N7 MEASUREMENT OF CARDIAC SAMPLING AND PRESSURE, LEFT HEART, PERCUTANEOUS APPROACH: ICD-10-PCS | Performed by: INTERNAL MEDICINE

## 2021-01-01 PROCEDURE — 80053 COMPREHEN METABOLIC PANEL: CPT

## 2021-01-01 PROCEDURE — 02HP32Z INSERTION OF MONITORING DEVICE INTO PULMONARY TRUNK, PERCUTANEOUS APPROACH: ICD-10-PCS | Performed by: INTERNAL MEDICINE

## 2021-01-01 PROCEDURE — 84443 ASSAY THYROID STIM HORMONE: CPT

## 2021-01-01 PROCEDURE — 99243 OFF/OP CNSLTJ NEW/EST LOW 30: CPT | Performed by: INTERNAL MEDICINE

## 2021-01-01 PROCEDURE — 80061 LIPID PANEL: CPT

## 2021-01-01 PROCEDURE — 99223 1ST HOSP IP/OBS HIGH 75: CPT | Performed by: OTHER

## 2021-01-01 RX ORDER — ERGOCALCIFEROL 1.25 MG/1
50000 CAPSULE ORAL WEEKLY
COMMUNITY
Start: 2021-01-01

## 2021-01-01 RX ORDER — POTASSIUM CHLORIDE 14.9 MG/ML
20 INJECTION INTRAVENOUS ONCE
Status: COMPLETED | OUTPATIENT
Start: 2021-01-01 | End: 2021-01-01

## 2021-01-01 RX ORDER — HEPARIN SODIUM 1000 [USP'U]/ML
INJECTION, SOLUTION INTRAVENOUS; SUBCUTANEOUS
Status: COMPLETED
Start: 2021-01-01 | End: 2021-01-01

## 2021-01-01 RX ORDER — HEPARIN SODIUM AND DEXTROSE 10000; 5 [USP'U]/100ML; G/100ML
INJECTION INTRAVENOUS CONTINUOUS
Status: DISCONTINUED | OUTPATIENT
Start: 2021-01-01 | End: 2021-01-01

## 2021-01-01 RX ORDER — HEPARIN SODIUM 1000 [USP'U]/ML
5000 INJECTION, SOLUTION INTRAVENOUS; SUBCUTANEOUS ONCE
Status: COMPLETED | OUTPATIENT
Start: 2021-01-01 | End: 2021-01-01

## 2021-01-01 RX ORDER — SODIUM CHLORIDE 9 MG/ML
INJECTION, SOLUTION INTRAVENOUS ONCE
Status: DISCONTINUED | OUTPATIENT
Start: 2021-01-01 | End: 2021-01-01

## 2021-01-01 RX ORDER — POTASSIUM CHLORIDE 29.8 MG/ML
40 INJECTION INTRAVENOUS ONCE
Status: COMPLETED | OUTPATIENT
Start: 2021-01-01 | End: 2021-01-01

## 2021-01-01 RX ORDER — AMIODARONE HYDROCHLORIDE 50 MG/ML
INJECTION, SOLUTION INTRAVENOUS
Status: COMPLETED | OUTPATIENT
Start: 2021-01-01 | End: 2021-01-01

## 2021-01-01 RX ORDER — LIDOCAINE HYDROCHLORIDE 20 MG/ML
INJECTION, SOLUTION EPIDURAL; INFILTRATION; INTRACAUDAL; PERINEURAL
Status: COMPLETED
Start: 2021-01-01 | End: 2021-01-01

## 2021-01-01 RX ORDER — NORGESTIMATE AND ETHINYL ESTRADIOL
KIT
Qty: 84 TABLET | Refills: 0 | OUTPATIENT
Start: 2021-01-01

## 2021-01-01 RX ORDER — DEXTROSE AND SODIUM CHLORIDE 5; .9 G/100ML; G/100ML
INJECTION, SOLUTION INTRAVENOUS CONTINUOUS
Status: DISCONTINUED | OUTPATIENT
Start: 2021-01-01 | End: 2021-10-02

## 2021-01-01 RX ORDER — TRAZODONE HYDROCHLORIDE 50 MG/1
50 TABLET ORAL NIGHTLY PRN
COMMUNITY

## 2021-01-01 RX ORDER — LORAZEPAM 2 MG/ML
INJECTION INTRAMUSCULAR
Status: COMPLETED
Start: 2021-01-01 | End: 2021-01-01

## 2021-01-01 RX ORDER — LEVETIRACETAM 500 MG/5ML
1000 INJECTION, SOLUTION, CONCENTRATE INTRAVENOUS EVERY 12 HOURS
Status: DISCONTINUED | OUTPATIENT
Start: 2021-01-01 | End: 2021-10-02

## 2021-01-01 RX ORDER — ONDANSETRON 8 MG/1
8 TABLET, ORALLY DISINTEGRATING ORAL EVERY 8 HOURS PRN
COMMUNITY

## 2021-01-01 RX ORDER — DEXTROSE AND SODIUM CHLORIDE 5; .9 G/100ML; G/100ML
INJECTION, SOLUTION INTRAVENOUS CONTINUOUS
Status: DISCONTINUED | OUTPATIENT
Start: 2021-01-01 | End: 2021-01-01

## 2021-01-01 RX ORDER — CALCIUM GLUCONATE 94 MG/ML
2 INJECTION, SOLUTION INTRAVENOUS ONCE
Status: COMPLETED | OUTPATIENT
Start: 2021-01-01 | End: 2021-01-01

## 2021-01-01 RX ORDER — DEXTROSE MONOHYDRATE 25 G/50ML
50 INJECTION, SOLUTION INTRAVENOUS
Status: DISCONTINUED | OUTPATIENT
Start: 2021-01-01 | End: 2021-10-02

## 2021-01-01 RX ORDER — NALOXONE HYDROCHLORIDE 0.4 MG/ML
INJECTION, SOLUTION INTRAMUSCULAR; INTRAVENOUS; SUBCUTANEOUS
Status: COMPLETED | OUTPATIENT
Start: 2021-01-01 | End: 2021-01-01

## 2021-01-01 RX ORDER — NORGESTIMATE AND ETHINYL ESTRADIOL
KIT
Qty: 84 TABLET | Refills: 1 | OUTPATIENT
Start: 2021-01-01

## 2021-01-01 RX ORDER — HEPARIN SODIUM 1000 [USP'U]/ML
10 INJECTION, SOLUTION INTRAVENOUS; SUBCUTANEOUS ONCE
Status: COMPLETED | OUTPATIENT
Start: 2021-01-01 | End: 2021-01-01

## 2021-01-01 RX ORDER — ALBUMIN, HUMAN INJ 5% 5 %
SOLUTION INTRAVENOUS
Status: COMPLETED
Start: 2021-01-01 | End: 2021-01-01

## 2021-01-01 RX ORDER — SODIUM CHLORIDE 9 MG/ML
INJECTION, SOLUTION INTRAVENOUS
Status: COMPLETED | OUTPATIENT
Start: 2021-01-01 | End: 2021-01-01

## 2021-01-01 RX ORDER — DOBUTAMINE HYDROCHLORIDE 200 MG/100ML
INJECTION INTRAVENOUS CONTINUOUS
Status: DISCONTINUED | OUTPATIENT
Start: 2021-01-01 | End: 2021-01-01

## 2021-01-01 RX ORDER — NORGESTIMATE AND ETHINYL ESTRADIOL 7DAYSX3 LO
1 KIT ORAL DAILY
Qty: 84 TABLET | Refills: 0 | Status: SHIPPED | OUTPATIENT
Start: 2021-01-01 | End: 2021-01-01

## 2021-01-01 RX ORDER — DESVENLAFAXINE 100 MG/1
100 TABLET, EXTENDED RELEASE ORAL DAILY
Qty: 90 TABLET | Refills: 1 | Status: SHIPPED
Start: 2021-01-01

## 2021-01-01 RX ORDER — MORPHINE SULFATE IN 0.9 % NACL 1 MG/ML
0.5 PLASTIC BAG, INJECTION (ML) INTRAVENOUS CONTINUOUS
Status: DISCONTINUED | OUTPATIENT
Start: 2021-01-01 | End: 2021-10-02

## 2021-01-01 RX ORDER — NORGESTIMATE AND ETHINYL ESTRADIOL 7DAYSX3 LO
1 KIT ORAL DAILY
Qty: 84 TABLET | Refills: 0 | Status: SHIPPED | OUTPATIENT
Start: 2021-01-01

## 2021-01-01 RX ORDER — HEPARIN SODIUM AND DEXTROSE 10000; 5 [USP'U]/100ML; G/100ML
12 INJECTION INTRAVENOUS ONCE
Status: COMPLETED | OUTPATIENT
Start: 2021-01-01 | End: 2021-01-01

## 2021-01-01 RX ORDER — METOPROLOL TARTRATE 5 MG/5ML
INJECTION INTRAVENOUS
Status: COMPLETED
Start: 2021-01-01 | End: 2021-01-01

## 2021-01-01 RX ORDER — SODIUM CHLORIDE 9 MG/ML
INJECTION, SOLUTION INTRAVENOUS ONCE
Status: COMPLETED | OUTPATIENT
Start: 2021-01-01 | End: 2021-01-01

## 2021-01-01 RX ORDER — ALBUMIN, HUMAN INJ 5% 5 %
500 SOLUTION INTRAVENOUS ONCE
Status: COMPLETED | OUTPATIENT
Start: 2021-01-01 | End: 2021-01-01

## 2021-01-01 RX ORDER — QUETIAPINE 25 MG/1
6.25 TABLET, FILM COATED ORAL DAILY PRN
COMMUNITY

## 2021-01-02 NOTE — ED PROVIDER NOTES
Patient presents with:  Covid-19 Test      HPI:     Sukumar Hopkins is a 53year old female who presents for a Covid test.  She states for the past 4 days she has had some diarrhea, nasal congestion, and fatigue.   She did have a direct exposure to [employer]: Male    Lifestyle      Physical activity        Days per week: Not on file        Minutes per session: Not on file      Stress: Not on file    Relationships      Social connections        Talks on phone: Not on file        Gets together: Not difficulty  GI: soft, non-tender, normal bowel sounds. No distention or masses palpated.    HEAD: normocephalic  EYES: sclera non icteric bilateral, conjunctiva clear  EARS: TM  bilateral: fluid present  NOSE: nasal turbinates: pink, normal mucosa  THROAT:

## 2021-01-02 NOTE — ED INITIAL ASSESSMENT (HPI)
Pt states for the past 5 days, congestion, cough and diarrhea with constipation. Boyfriend's daughter positive with covid. Pt was not by the daughter, but was near her boyfriend.

## 2021-05-20 PROBLEM — Z12.4 ROUTINE PAPANICOLAOU SMEAR: Status: ACTIVE | Noted: 2021-01-01

## 2021-05-20 NOTE — ASSESSMENT & PLAN NOTE
Normal exam.  Labs as ordered. Skin check normal.  Scattered nevi present. Breast exam completed–no palpable abnormalities, discharge from the nipples or axillary adenopathy.   Has been ordered, advised to complete this about 6 to 8 weeks after the last C

## 2021-05-20 NOTE — ASSESSMENT & PLAN NOTE
Last pap 1/2020    Atypical squamous cells of undetermined significance (ASC-US)    Advised to 6-month follow-up but is slightly delayed. Completed today.   Advised to follow-up with gynecology for management

## 2021-05-20 NOTE — ASSESSMENT & PLAN NOTE
Blood pressure (!) 140/91, pulse 102, temperature 97.9 °F (36.6 °C), temperature source Tympanic, resp. rate 16, height 5' 8\" (1.727 m), weight 174 lb (78.9 kg), last menstrual period 05/10/2021, not currently breastfeeding.      Blood pressure and heart r

## 2021-05-20 NOTE — PROGRESS NOTES
HPI:   Janie Torres is a 49year old female who presents for an Annual Health Visit.        Allergies:     Adenosine               RESPIRATORY FAILURE    Comment:Paralysis             Unstable vital signs             Other reaction(s): Respiratory HISTORY  12/2013    Cardiac ablation      Family History   Problem Relation Age of Onset   • Cancer Father         Lung cancer (smoker); per NG   • Breast Cancer Mother         per NG   • Kidney Disease Mother         Kent's disease; per NG   • Other (Ot normal.      Mouth/Throat:      Mouth: Mucous membranes are moist.      Pharynx: No oropharyngeal exudate. Eyes:      General: Lids are normal.         Right eye: No discharge. Left eye: No discharge.       Extraocular Movements: Extraocular movem adnexa normal to palpation, normal in size; no masses.  Bladder is normal    Musculoskeletal:         General: No tenderness. Normal range of motion. Cervical back: Normal range of motion and neck supple. Right lower leg: No edema.       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 Completed today.   Advised to f month recheck and so over due. Completed today.   Immunizations-    Immunization History   Administered Date(s) Administered   • Covid-19 Vaccine Pfizer 30 mcg/0.3 ml 04/03/2021, 05/01/2021   • FLULAVAL 6 months & older 0.5 ml Prefilled syringe (07981) 10/

## 2021-05-20 NOTE — PATIENT INSTRUCTIONS
Problem List Items Addressed This Visit        Unprioritized    Atypical squamous cell changes of undetermined significance (ASCUS) on cervical cytology with positive high risk human papilloma virus (HPV)       Last pap 1/2020    Atypical squamous cells of movement tenderness, adnexal palpable abnormalities. No cystocele, rectocele or uterine descent. Last pap 1/2020,due for 6 month recheck and so over due. Completed today.   Immunizations-    Immunization History   Administered Date(s) Administered   • Co

## 2021-06-02 NOTE — TELEPHONE ENCOUNTER
Patient called back and stated she did have a pap and pelvic done by PCP, and results our my chart. Please see and refill medication asap also has mammogram ordered.

## 2021-06-02 NOTE — TELEPHONE ENCOUNTER
Pt called and informed of Dr. Holly Jacinto recommendations. Pt voices she had a mammogram scheduled for today, but was told by Dr. Denys Shaikh, that since she just had her Covid vaccine a few weeks ago, she should cancel and reschedule her appointment. Due to scheduling issues, pt was able to reschedule her mammogram for 08/31/21. Pt voices she will have her [address] [facility] done with some other lab work that needs to be scheduled. Order placed. Pt had gyne exam done by Dr. Denys Shaikh because she was already there for an appointment, and was already so far behind on her yearly. Pt still wants ajb to be her Gyne doctor and prescribe her birth control.

## 2021-06-02 NOTE — TELEPHONE ENCOUNTER
Please inform that we can refill her birth control pills for one 3-month timeframe and then a full year once her mammogram is back. It is important that she have a screening mammogram if she is to continue to receive hormones. Her last mammogram on record is from September 2015, going on 6 years. As she is 49years old, I recommend that she test for menopause with a blood test done at the end of the fourth week of her birth control pill pack (sugar pills). Please order [address]. When menopause is confirmed by the blood test, she will be able to discontinue them. Repeat yearly until in menopausal range. She can also have Twylla Councilman, who did her GYN exam, manage and order her birth control pills.

## 2021-07-29 PROBLEM — K59.09 CHRONIC CONSTIPATION: Status: ACTIVE | Noted: 2021-01-01

## 2021-07-29 NOTE — PATIENT INSTRUCTIONS
1. Consider miralax 1 capful a day with water to help with bowel movements  2.  [facility] gastroenterology first and then high risk anesthesia department

## 2021-07-29 NOTE — H&P
[facility] - Gastroenterology                                                                                                  Clinic History and Physical stenosis 3/20/2018   • Menorrhagia    • Menorrhagia 3/1/2016   • POTS (postural orthostatic tachycardia syndrome)       Past Surgical History:   Procedure Laterality Date   • ELECTROCARDIOGRAM, COMPLETE  03-    SCANNED TO MEDIA TAB: 03-   • H Anxiety. 60 tablet 0   • Amphetamine-Dextroamphet ER 20 MG Oral Capsule SR 24 Hr Take 10 mg by mouth Noon. • Multiple Vitamins-Minerals (MULTIVITAMIN & MINERAL OR) Take by mouth daily.            Allergies:    Adenosine               RESPIRATORY FAILURE imaging were reviewed and discussed with patient today.       .  ASSESSMENT/PLAN:   Parish Haywood is a 49year old year-old female with history of ADHD, hx of cardiac ablation (SVT)--->then dx with POTS, hx of anal fistula (s/p surgery), anxiety, EDS, do a c-scope. Recommend:  1. Consider miralax 1 capful a day with water to help with bowel movements  2.  [facility] gastroenterology first and then high risk anesthesia department        Orders This Visit:  No orders of the defined types were cary

## 2021-07-30 PROBLEM — M62.89 PELVIC FLOOR DYSFUNCTION: Status: ACTIVE | Noted: 2021-01-01

## 2021-09-27 PROBLEM — I46.9 CARDIAC ARREST (HCC): Status: ACTIVE | Noted: 2021-01-01

## 2021-09-27 NOTE — ED QUICK NOTES
MD at bedside confirming ET tube with glideoscope, traci machine started on patient to continue compressions.

## 2021-09-27 NOTE — SPIRITUAL CARE NOTE
Responded to full arrest in the ED. Pt arrived without a pulse.  arrived with ex- Angle Cespedes who says pt called him with arm pain and indigestion. Asked if he would bring meds from local store.   When he arrived, pt was not breathing and h

## 2021-09-27 NOTE — H&P
[facility]    History and Physical    Tom Rico Patient Status:  Inpatient    1970 MRN [id number]   Location [facility] 2W/SW Attending Nghia Arteaga MD   Hosp Day # 0 PCP Darcy English MD     Date:  2021 3/1/2016   • POTS (postural orthostatic tachycardia syndrome)      Past Surgical History:   Procedure Laterality Date   • ELECTROCARDIOGRAM, COMPLETE  03-    SCANNED TO MEDIA TAB: 03-   • HEMORRHOIDECTOMY  2004    Internal and External Hemorr total) by mouth daily. QUEtiapine Fumarate 25 MG Oral Tab, Take 2 tablets (50 mg total) by mouth nightly. clonazePAM 1 MG Oral Tab, Take 1 tablet (1 mg total) by mouth 3 (three) times daily as needed for Anxiety.   Amphetamine-Dextroamphet ER 20 MG Oral C 04/13/2016    TROP 0.049 () 09/27/2021    B12 378 06/11/2021         Assessment/Plan:     1- Out of Hospital prolonged Cardiac arrest ([facility] [address]  Refractory V. fib and V. tach  Estimated downtime > 40 min until ROSC   Emergent cath reported with patent corona

## 2021-09-27 NOTE — PARENT/FAMILY INTERACTION NOTE
Patient's 26 y/o daughter Gisell Ceron is the legal next of kin. However, she does not wish to be the decision-maker and her father expressed concerns about her ability to do so. The family would like the sister Lauren Mendoza to be the care partner and decision-maker.  Gra

## 2021-09-27 NOTE — ED QUICK NOTES
Pulse check: no pulse   Self converted Rhythm from vfib to Bradycardiac ST elevated appearing rhythm per Dr. Tangela wallace

## 2021-09-27 NOTE — ED QUICK NOTES
Pt arrived via EMS in cardiac arrest, with traci on chest giving compressions.  EMS report that patient was on the phone with him complaining of jaw pain, heartburn, and left arm pain when the patient stopped responding just prior to arrival. Pt arrived, pu

## 2021-09-27 NOTE — CONSULTS
[facility]    Report of Consultation    Donte Pulliam Patient Status:  Inpatient    1970 MRN [id number]   Location [facility] 2W/SW Attending Diamond Redman MD   Hosp Day # 0 PCP Osvaldo Mcdermott MD     Date of Admissio POTS (postural orthostatic tachycardia syndrome)        Past Surgical History  Past Surgical History:   Procedure Laterality Date   • ELECTROCARDIOGRAM, COMPLETE  03-    SCANNED TO MEDIA TAB: 03-   • HEMORRHOIDECTOMY  2004    Internal and Ext mL, Intravenous, Once      Norgestim-Eth Estrad Triphasic (TRI-LO-SPRINTEC) 0.18/0.215/0.25 MG-25 MCG Oral Tab, Take 1 tablet by mouth daily. ergocalciferol 1.25 MG (24948 UT) Oral Cap, Take 50,000 Units by mouth once a week.   Desvenlafaxine Succinate ER Laboratory Data:  Lab Results   Component Value Date    WBC 6.1 06/11/2021    HGB 15.9 06/11/2021    HCT 49.1 (H) 06/11/2021    .0 06/11/2021    CREATSERUM 1.30 (H) 09/27/2021    BUN 13 09/27/2021     09/27/2021    K 4.3 09/27/2021    CL 1

## 2021-09-27 NOTE — ED QUICK NOTES
EMS report that patient was in Vfib and received 2 defibrillations prior to arrival with 2 amps of epi given

## 2021-09-27 NOTE — ED PROVIDER NOTES
Patient Seen in: [facility].      History   Patient presents with:  Cardiac Arrest    Stated Complaint: caridac arrest    Subjective:   HPI    49year old female female with h/o POTS who presents as cardiac arrest from home.  E Conjunctivae normal.   Cardiovascular:      Pulses:           Carotid pulses are 0 on the right side and 0 on the left side. Femoral pulses are 0 on the right side and 0 on the left side.      Comments: No pulse  Pulmonary:      Effort: She is intubat Antibody Screen[[id number]]                                  In process                   Please view results for these tests on the individual orders.    ANTIBODY SCREEN   RAINBOW DRAW BLUE   RAINBOW DRAW LAVENDER   RAINBOW DRAW LIGHT GREEN   Carie. Angel Álvarez 135 amiodarone bolus and drip. Patient did go into V. fib at least 2–3 times and was successfully defibrillated out after which there is a bradycardic rhythm with large ST elevation. The patient only regained a pulse for a few seconds and then lost it again.

## 2021-09-27 NOTE — CONSULTS
[facility]    PATIENT'S NAME: Mattie Katheryn   ATTENDING PHYSICIAN: Jesse Linton MD   CONSULTING PHYSICIAN: Terrie Shore.  Ford Jaramillo MD   PATIENT ACCOUNT#:   [id number]    LOCATION:  [place] [address] #:   [id number]       DATE OF BIR available. ASSESSMENT:  Out-of-hospital cardiac arrest.  Intermittent recovery of rhythm in the ER. When rhythm restored, ischemic EKG changes noted with ST elevation. The patient unresponsive and getting CPR. Other medical problems as above.     PL

## 2021-09-27 NOTE — ED QUICK NOTES
Cath lab team at bedside, pt taken to cath lab with this RN on defib monitor, cardiac monitor, EDTx1, cath lab RN x2, and RT with traci monitor giving compressions and proving ventilations by bag/mask. Pt given epi prior to leaving for cath lab.

## 2021-09-27 NOTE — PROCEDURES
Preop: Out of hospital cardiac arrest.  Postop: Same. CAD. Procedure: Impella support device via right femoral artery. Pulmonary artery catheter via left femoral vein. Left heart cath, coronary angiogram via left femoral artery. Sheath left in place.

## 2021-09-27 NOTE — PROGRESS NOTES
arrived while nurses were attending to pt needs. Nurse informed  family was waiting in the family lounge area.  spoke with pt family which included ex  and sister.    offered an non-anxious presence and reflective

## 2021-09-27 NOTE — CONSULTS
Cardiology (consult dictated)    Assessment:  1. Out of hospital cardiac arrest.  Intermittent recovery of rhythm in the ER. When rhythm restored, ischemic EKG changes are noted. Unresponsive and getting CPR.     2.  History of ADHD, bipolar disorder, au

## 2021-09-28 NOTE — PAYOR COMM NOTE
--------------  ADMISSION REVIEW     Payor: [employer]  Subscriber #:  [id number]  Authorization Number: [id number]    Admit date: 9/27/21  Admit time:  2:32 PM       REVIEW DOCUMENTATION:     ED Provider Notes      ED Provider Notes signed by Ferny Landin Comments: Antonioa Josep device in place giving compressions. ETT in place, checked with glide scope and the tube is in appropriate position going through the vocal cords, good color change on CO2 monitor as well as bilateral breath sounds on auscultation.     HENT: Narrative: The following orders were created for panel order Type and screen.   Procedure                               Abnormality         Status                     ---------                               -----------         ------ premix flush (has no administration in time range)   Iopamidol (ISOVUE-M) 61 % injection 250 mL (200 mL Injection Given 9/27/21 1408)     ACLS care continued in the ER with compressions, multiple rounds of epinephrine, amiodarone bolus and drip.   Patient d unresponsive,  called 46 and upon arrival she was unresponsive with no pulse, she was shocked and intubated and ACLS protocol started with CPR with multiple shocks for v-fib ,  and failed to achieve return to spontaneous circulation .    Upon Jackie Ruvalcaba hepatosplenomegaly. Diminished BS    Lymphadenopathy:     She has no cervical adenopathy. Neurological:   Unresponsive to verbal or pain stimuli  No gag reflex  No corneal reflex  Myoclonic jerks   Skin: Skin is dry.          Results:     Lab Results Prognosis is guarded   D/w staff   Will f/u closely   35 min cct       Georgi Frye.  Yessenia Rubin MD  9/27/2021    Electronically signed by Luzma Kramer MD on 9/27/2021  4:24 PM       [facility]     PATIENT'S NAME: Earma La Harpe Impella device was seen to be in good position.     LEFT CORONARY ANGIOGRAPHY:  The left main coronary artery is normal and bifurcates into the LAD and circumflex.   The circumflex artery is normal.  It gives off a very large obtuse marginal branch which is satisfactory        Plan:     -  Continue full Impella support.     -  Ventilator management per pulmonary.     -  Continue same drips.   Monitor and correct metabolic abnormalities.     -  Rewarm patient approximately 6 PM this evening per protocol     - CREATSERUM 1.99 09/28/2021     CREATSERUM 1.99 09/28/2021      09/28/2021      09/28/2021     MG 1.9 09/28/2021     CA 5.9 09/28/2021     CA 5.9 09/28/2021      09/28/2021      09/28/2021      09/28/2021      09/28 (PORCINE) drip 17082slowk/250mL infusion CONTINUOUS     Date Action Dose Route User    9/28/2021 0800 Hi-Risk Rate/Dose Verify  Intravenous May Owens RN    9/28/2021 2031 Hi-Risk Rate/Dose Change  Intravenous Abeba Olivarez RN    9/28/2021 0447 Hi- Change  Intravenous Raiza Ferreira RN    9/28/2021 0900 Rate/Dose Change  Intravenous Raiza Ferreira RN    9/28/2021 0455 Rate/Dose Change  Intravenous Shirley Reddy RN    9/28/2021 0331 Rate/Dose Change  Intravenous Shirley Reddy, DARIAN    9/28/2021 0 bicarbonate injection 50 mEq     Date Action Dose Route User    9/27/2021 2054 Given  Intravenous Kim , RN    9/27/2021 2053 Given  Intravenous Kim , RN      sodium bicarbonate injection 50 mEq     Date Action Dose Route User    9/28/20 100 % — Ventilator — RB    09/28/21 0300 91.9 °F (33.3 °C) 81 19 — 99 % — Ventilator — RB    09/28/21 0200 91.8 °F (33.2 °C) 83 21 61/51 100 % — Ventilator — RB    09/28/21 0100 91.6 °F (33.1 °C) 73 22 — 100 % — Ventilator — RB    09/28/21 0000 91.9 °F (33

## 2021-09-28 NOTE — PROGRESS NOTES
Patient received on vent settings noted. No vent changes made overnight. Morning ABG was done PH 7.17 Pco2 39   Po2 265 Hco3 14.2 reported to RN. RT will continue to monitor.       09/28/21 7814   Vent Information   $ RT Standby Charge (per 15 min) 1  (vent

## 2021-09-28 NOTE — PLAN OF CARE
Comatose - on propofol, (+)pupil response, (-)cough/gag, non-reactive to pain  Rec'd on vent, FiO2 weaned as able based on ABG results, bicarb given for base excess  2L bolus given for pre-load, improved hemodynamics seen, discussed w/ Dr. Fariba Palacios (oncall patient/family  - Incorporate patient and family knowledge, values, beliefs, and cultural backgrounds into the planning and delivery of care  - Encourage patient/family to participate in care and decision-making at the level they choose  - Honor patient an ADULT  Goal: Hemodynamic stability and optimal renal function maintained  Description: INTERVENTIONS:  - Monitor labs and assess for signs and symptoms of volume excess or deficit  - Monitor intake, output and patient weight  - Monitor urine specific Thad Doctor Monitor for signs/symptoms of CO2 retention  Outcome: Progressing     Problem: Patient Centered Care  Goal: Patient preferences are identified and integrated in the patient's plan of care  Description: Interventions:  - What would you like us to know as we trend weights  Outcome: Progressing  Goal: Absence of cardiac arrhythmias or at baseline  Description: INTERVENTIONS:  - Continuous cardiac monitoring, monitor vital signs, obtain 12 lead EKG if indicated  - Evaluate effectiveness of antiarrhythmic and hea

## 2021-09-28 NOTE — PROGRESS NOTES
[facility] HOSP - [facility]    Progress Note    Janie Torres Patient Status:  Inpatient    1970 MRN [id number]   Location [facility] 2W/SW Attending Juan Lazo MD   Hosp Day # 1 PCP Eddy Coleman MD     Subjective:     Nayeli Dwyer 09/28/2021     (H) 09/28/2021     (H) 09/28/2021     (H) 09/28/2021    PTT >240.0 (HH) 09/28/2021    INR 1.49 (H) 09/28/2021    PT 11.8 12/11/2014    T4F 0.9 06/11/2021    TSH 1.550 06/11/2021    DDIMER < 0.27 03/03/2014    ESRML 5 08/ anoxic brain injury     Hypothermic protocol   IV Keppra   Supportive care   Head ct today   Neuro following      3- acute respiratory failure   Vent support   Fio2 40 %   CXR clear     4- Metabolic acidosis and PAIGE   bicarbonate drip  IV Lebophed for SBP

## 2021-09-28 NOTE — PROGRESS NOTES
[facility]  Neurology Progress Note    Juliette Moser Patient Status:  Inpatient    1970 MRN [id number]   Location [facility] 2W/SW Attending Brenda Mcintyre MD   Hosp Day # 1 PCP Joseline Morel MD     Subjective:  Muna Sun injection 25 mcg, 25 mcg, Intravenous, Q1H PRN  norepinephrine (LEVOPHED) 4 mg/250 ml premix infusion, 0.5-30 mcg/min, Intravenous, Continuous  dextrose 5 % and 0.9 % NaCl infusion, , Intravenous, Continuous  Pantoprazole Sodium (PROTONIX) 40 mg in Sodium (LL) 09/28/2021    ALB 2.3 (L) 09/28/2021    ALB 2.0 (L) 09/28/2021    ALKPHO 59 09/28/2021    ALKPHO 60 09/28/2021    BILT 0.4 09/28/2021    BILT 0.4 09/28/2021    TP 4.8 (L) 09/28/2021    TP 4.7 (L) 09/28/2021     (H) 09/28/2021     (H) 09/ disorder with single episode, in partial remission (HCC)     CB (generalized anxiety disorder)     Routine Papanicolaou smear     Chronic constipation     Pelvic floor dysfunction     Cardiac arrest ([facility]     Anoxic encephalopathy ([facility] [address]      Most likely d

## 2021-09-28 NOTE — PROGRESS NOTES
[address] PROGRESS NOTE  Ann Marie Townsend Patient Status:  Inpatient    1970 MRN [id number]   Location [facility] 2W/SW At °C)      Intake/Output:    Intake/Output Summary (Last 24 hours) at 9/28/2021 0710  Last data filed at 9/28/2021 0600  Gross per 24 hour   Intake 6117.25 ml   Output 930 ml   Net 5187.25 ml       Wt Readings from Last 2 Encounters:  09/28/21 : 189 lb 9.5 o • heparin 25,000 units in D5W 500 ml IMPELLA FLUID 16 mL/hr (09/27/21 1617)   • norepinephrine Stopped (09/28/21 0541)   • Dextrose-NaCl 125 mL/hr at 09/28/21 0600   • propofol 25 mcg/kg/min (09/28/21 0200)   • Continuous dose Heparin infusion 200 Units/

## 2021-09-28 NOTE — PROCEDURES
[facility]    PATIENT'S NAME: Elijah Simpson   ATTENDING PHYSICIAN: Kathy Saravia MD   OPERATING PHYSICIAN: Zabrina Javier.  Frank Wallace MD   PATIENT ACCOUNT#:   [id number]    LOCATION:  [place] [address] #:   [id number]       DATE OF [facility] It gives off a very large obtuse marginal branch which is free of disease. The left anterior descending artery appears to be occluded in its midportion. Ultimately, with additional injections, flow was restored to the apex without balloon intervention.

## 2021-09-28 NOTE — SPIRITUAL CARE NOTE
Pt surrounded by family at bedside. Pt was sleeping as the family was discussing who would be the care giver, since Shaq Mcwilliams (daughter) did not want to be. I left the family to their decision making and provided them with privacy.   I returned around 9:35 pm an

## 2021-09-29 NOTE — PROCEDURES
[facility] HOSP - [facility]    Cardiac Cath Procedure Note  [facility] Patient Status:  Inpatient    1970 MRN [id number]   Location [facility] Attending Carmelita Hilario MD   Hosp Day # 2 PCP Natali Graves MD access site. No iliac extravasation. Of note there was beading of the right external iliac, left profunda. Right radial artery was cannulated with ultrasound guidance and micropuncture sheath.   Exchanged for 5 Burmese sheath and left in place for art

## 2021-09-29 NOTE — PROGRESS NOTES
[address] PROGRESS NOTE  Lora Yuen Patient Status:  Inpatient    1970 MRN [id number]   Location [facility] 2W/SW At kg/m²     Temp (24hrs), Av.1 °F (34.5 °C), Min:91.6 °F (33.1 °C), Max:97.5 °F (36.4 °C)      Intake/Output:    Intake/Output Summary (Last 24 hours) at 2021 1056  Last data filed at 2021 0640  Gross per 24 hour   Intake 92008.21 ml   Output 1 3509)   • amiodarone 0.5 mg/min (09/28/21 1800)   • heparin 25,000 units in D5W 500 ml IMPELLA FLUID 17.4 mL/hr (09/28/21 1730)   • propofol 20 mcg/kg/min (09/29/21 0930)   • Continuous dose Heparin infusion 400 Units/hr (09/29/21 0825)

## 2021-09-29 NOTE — CM/SW NOTE
Met with patient's family, sister Renuka Malloy, daughter Donna Melendez, and patient's ex . Confirmed with family that daughter Donna Melendez will be primary decision maker. [place] adult daughter would be first in line as decision maker.   Edna zurita

## 2021-09-29 NOTE — DIETARY NOTE
NUTRITION NOTE:     Pt admit 9/27- no nutrition screen however eval for any weight loss per weight hx is negative (wt stable) and no decrease in po intake per chart PTA. Pt wt/ht reflects mild overweight status.  Pt well nourished prior

## 2021-09-29 NOTE — PROCEDURES
Use vaginal EEG report    REFERRING PHYSICIAN: Lainey Gutierrez MD  PCP and phone number:  Alanis Shaw MD  [phone]    TECHNIQUE: 21 channels of EEG, 2 channels of EOG, and 1 channel of EKG were recorded utilizing the International 10/20 System suppression is seen and is consistent with severe hypoxic encephalopathy, certain stages of general anesthesia, and drug-induced coma. No focal, lateralized, or epileptiform features are noted.

## 2021-09-29 NOTE — PROGRESS NOTES
[facility]    Progress Note    Manual Courser Patient Status:  Inpatient    1970 MRN [id number]   Location [facility] 2W/SW Attending Corazon Padilla MD   Hosp Day # 2 PCP Jackie Frazier MD     Subjective:     Alicia Bach 2.8 (L) 09/29/2021     (H) 09/29/2021     (H) 09/29/2021    ALT 80 (H) 09/29/2021    ALT 80 (H) 09/29/2021    .9 (H) 09/29/2021    INR 1.68 (H) 09/29/2021    PT 11.8 12/11/2014    T4F 0.9 06/11/2021    TSH 1.550 06/11/2021    DDIMER < cath reported with patent coronaries , diminished LVEF 25 %   S/p Impella device and PA catheter and cooling catheter  IV Amiodarone   IV Heparin   Supportive care     2- shock state   Cardiogenic and also hypovolemic / ?  SIRS   hgb dropped to 5.7 ( ? retr

## 2021-09-29 NOTE — PLAN OF CARE
Electrolytes replaced at start of shift. Rewarming started at 630pm today. Noted low total hgb on ABG, redraw and hgb 5.2. Dr Elex Pallas paged and 2U given.  Noted slight oozing at both groin access points however, mostly dried blood, left femoral site began oo fluid balance, assess for edema, trend weights  Outcome: Progressing  Goal: Absence of cardiac arrhythmias or at baseline  Description: INTERVENTIONS:  - Continuous cardiac monitoring, monitor vital signs, obtain 12 lead EKG if indicated  - Evaluate effect

## 2021-09-29 NOTE — RESPIRATORY THERAPY NOTE
Received patient intubated/mechanically ventilated on full support. No vent changes made overnight. ABG/VBG completed.    Interface Invasive   Vent Type AV   Vent ID [id number]   Vent Mode VC/AC   Settings   FiO2 (%) 60 %   Resp Rate (Set) 12   Vt (Set, mL) 5

## 2021-09-29 NOTE — PROGRESS NOTES
[facility]  Neurology Progress Note    Braulio Clemente Patient Status:  Inpatient    1970 MRN [id number]   Location [facility] 2W/SW Attending Vidya Davis MD   Hosp Day # 2 PCP Flavia Greenwood MD     Subjective:  Andie Rosenbaum UNIT/ML injection 1-7 Units, 1-7 Units, Subcutaneous, 4 times per day  glucose (DEX4) oral liquid 15 g, 15 g, Oral, Q15 Min PRN   Or  glucose-vitamin C (DEX-4) chewable tab 4 tablet, 4 tablet, Oral, Q15 Min PRN   Or  dextrose 50 % injection 50 mL, 50 mL, I 94%   Weight:           General: Well nourished, well groomed. Head- Normocephalic, atraumatic  Eyes- No redness or swelling  Neck- No masses or adenopathy  CV: Some edema.     Neurological:     Mental Status-intubated, unresponsive, sluggish pupil reflex, Finalized by (CST): Mike Robles MD on 9/27/2021 at 4:27 PM          EKG 12 Lead    Result Date: 9/27/2021  ECG Report  Interpretation  -------------------------- Sinus Rhythm -Incomplete left bundle branch block. - Nonspecific T-abnormality.  ABNORMAL Wh the bedside    Celeste Pope MD

## 2021-09-30 NOTE — PLAN OF CARE
Remains unresponsive to any stimuli. Pupils sluggishly reactive. No corneal. No gag. No cough with deep suctioning. No response to pain. No movement noted. Sedation off and remains with no response. Currently on Amio, Dobut, Insulin, Bicarb gtts.  SWAN, CVP and/or venous puncture sites for bleeding and/or hematoma  - Assess quality of pulses, skin color and temperature  - Assess for signs of decreased coronary artery perfusion - ex.  Angina  - Evaluate fluid balance, assess for edema, trend weights  Outcome: P

## 2021-09-30 NOTE — PROGRESS NOTES
[address] PROGRESS NOTE  Amarilys Garcia Patient Status:  Inpatient    1970 MRN [id number]   Location [facility] 2W/SW At Component Value Date     09/30/2021     09/30/2021    K 3.8 09/30/2021    K 3.8 09/30/2021     09/30/2021     09/30/2021    CO2 22.0 09/30/2021    CO2 22.0 09/30/2021    BUN 26 09/30/2021    BUN 26 09/30/2021    CREATSERUM 1.80 09

## 2021-09-30 NOTE — CONSULTS
[facility]    PATIENT'S NAME: Britnickie Burns   ATTENDING PHYSICIAN: Alan Barragan MD   CONSULTING PHYSICIAN: Isael Hernández.  MD Moise   PATIENT ACCOUNT#:   [id number]    LOCATION:  [address] [place] [address] #:   [id number]       DATE OF [facility] unresponsive, unable to follow commands/participate in interview. PAST MEDICAL HISTORY:  POTS, SVT, status post ablation at [facility], ADHD, bipolar, report of Kristal-Danlos syndrome, depression, cervical radiculopathy, anxiety, menorrhagia.     MEDIC hemoglobin of 7. There is no evidence of overt DIC. Fibrinogen is adequate. She does not have a significant coagulopathy. We will continue to follow during inpatient stay. Thank you very much for the consultation request.    Dictated By Gloris Meckel.  Amarilys Davalos

## 2021-09-30 NOTE — PROGRESS NOTES
Pulmonary/Critical Care Follow Up Note    HPI:   Lora Yuen is a 49year old female with Patient presents with:  Cardiac Arrest      PCP Kell Nazario MD  Admission Attending David Greer MD    Hospital Day #3    Unresponsive  CT head with CVA Intravenous, Continuous  •  levETIRAcetam (KEPPRA) injection 1,000 mg, 1,000 mg, Intravenous, Q12H  •  fentaNYL citrate (SUBLIMAZE) 0.05 MG/ML injection 25 mcg, 25 mcg, Intravenous, Q1H PRN  •  Pantoprazole Sodium (PROTONIX) 40 mg in Sodium Chloride (PF) 0 09/30/2021    BILT 0.5 09/30/2021    TP 3.3 09/30/2021    TP 3.2 09/30/2021     09/30/2021     09/30/2021    ALT 72 09/30/2021    ALT 74 09/30/2021    PTT 42.2 09/30/2021    INR 1.41 09/30/2021    DDIMER 2.26 09/29/2021    MG 1.8 09/30/2021 9/30/2021    Family meeting at bedside  They agree to comfort care  They are waiting for another family member to arrive on flight  Plan comfort care   DNR/NDI   Start low dose morphine gtt

## 2021-09-30 NOTE — RESPIRATORY THERAPY NOTE
Pt received intubated ,A/C 18/500/60%+5. No changes done on current shift. Pt saturating appropriately.

## 2021-09-30 NOTE — CONSULTS
Brief hematology note. Called by ccm team for anemia thrombocytopenia. Out of hospital arrest, ventricular arrhythmia. Reviewed events, procedures, imaging and labs. Drop in hgb/platelets can only be explained by hemolysis or blood loss.  Hemolysis unlike

## 2021-09-30 NOTE — PROGRESS NOTES
[facility] AND [facility]  Neurology Progress Note    Malik Martínez Patient Status:  Inpatient    1970 MRN [id number]   Location [facility] 2W/SW Attending Georganne Meckel, MD   Hosp Day # 3 PCP Grecia Pyle MD     Subjective:  More [place] Continuous  Piperacillin Sod-Tazobactam So (ZOSYN) 3.375 g in dextrose 5% 100 mL IVPB-ADDV, 3.375 g, Intravenous, Q8H  sodium bicarbonate 100 mEq in Dextrose-NaCl 5-0.45 % 1,000 mL infusion, 100 mEq, Intravenous, Continuous  glucose (DEX4) oral liquid 15 g edema. Neurological:     Mental Status-intubated, unresponsive, sluggish pupil reflex, no oculocephalic reflex, no corneal reflexes. No withdrawal to pain anywhere.     Lab Results   Component Value Date    WBC 16.8 (H) 09/30/2021    HGB 8.1 (L) 09/30/2 anterior abdominal wall/pelvis, possibly small hematoma. 3. Diffuse thickening of the wall of the ascending colon suggestive of colitis. 4. Left femoral catheters in place as described above. 5. Small bilateral pleural effusions.  6. 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   Possibility of multifocal myoclonus raised the possibility of myoclonic

## 2021-09-30 NOTE — PLAN OF CARE
Pt non-responsive on or off sedation, pupils react, no response to deep pain stimuli, no cough or gag. Off Propofol early shift at MD request for exam, HR & RR increased, resumed sedation at lower dose.  BP more stable w/lower Propofol rate, able to wean of Assess the need for suctioning and perform as needed  - Assess for any respiratory difficulty  - Respiratory Therapy support as indicated  - Manage/alleviate anxiety  - Monitor for signs/symptoms of CO2 retention  Outcome: Not Progressing     Problem: Tawanda Neri sodium as indicated or ordered  - Monitor response to interventions for patient's volume status, including labs, urine output, blood pressure (other measures as available)  Outcome: Not Progressing     Problem: NEUROLOGICAL - ADULT  Goal: Achieves stable o

## 2021-10-01 NOTE — PROGRESS NOTES
[facility] HOSP - [facility]    Hematology/Oncology   Progress Note    Juliette Moser Patient Status:  Inpatient    1970 MRN [id number]   Location [facility] 2W/SW Attending Brenda Mcintyre MD   Hosp Day # 4 PCP MD Damon Hernandez 5:34 PM          XR CHEST AP PORTABLE  (CPT=71045)    Result Date: 9/30/2021  CONCLUSION:  1. No acute findings.     Dictated by (CST): Krystle Galicia MD on 9/30/2021 at 7:14 AM     Finalized by (CST): Krystle Galicia MD on 9/30/2021 at 7:1

## 2021-10-01 NOTE — PROGRESS NOTES
[facility]    Progress Note    Korina Forbes Patient Status:  Inpatient    1970 MRN [id number]   Location [facility] 2W/SW Attending Obdulio Frazier MD   Hosp Day # 4 PCP Rachele Mcnamara MD     Subjective:     Michael Single 09/30/2021    PT 11.8 12/11/2014    T4F 0.9 06/11/2021    TSH 1.550 06/11/2021    DDIMER 2.26 (H) 09/29/2021    ESRML 5 08/07/2017    CRP <0.5 08/07/2017    MG 1.8 09/30/2021    PHOS 2.4 (L) 09/28/2021    TROP 0.049 (HH) 09/27/2021    B12 378 06/11/2021 Unresponsive with no gag or corneal reflex with dilated pupils and nonreactive   Head CT with diffuse edema  Prognosis is guarded     3- shock state   Levophed       4-acute respiratory failure   Vent support   Fio2 40 %   CXR clear      5- h/o POTS ( post

## 2021-10-01 NOTE — PROGRESS NOTES
Family is requesting to not replace bags of pressors as they go dry so they can allow the patient to pass.

## 2021-10-01 NOTE — PROGRESS NOTES
Spouse and daughter Waleska Smith) became very concerned tonight when BP began to drop. They requested medications to increase BP because they \"Didn't think it would happen so soon\" Dr Randi Donovan notified, fluid bolus given, vaso, levo and dobutamine restarted.  They

## 2021-10-01 NOTE — PLAN OF CARE
Pt comatose, pupils fixed/dilated, no response to deep pain stimuli, no cough or gag reflex; + Babinski L foot, absent R foot. PA cath & R rad art line intact, art line nonfunctional, capped, PA monitoring stopped, IVF per order to introducer sheath.  Lisaai temperature, glucose, and sodium.  Initiate appropriate interventions as ordered  Outcome: Not Progressing  Goal: Absence of seizures  Description: INTERVENTIONS  - Monitor for seizure activity  - Administer anti-seizure medications as ordered  - Monitor ne

## 2021-10-04 ENCOUNTER — TELEPHONE (OUTPATIENT)
Dept: PULMONOLOGY | Facility: CLINIC | Age: 51
End: 2021-10-04

## 2021-10-04 NOTE — TELEPHONE ENCOUNTER
No fax received. Spoke with Shaneka Ellis at [facility], requesting death certificate to be re-faxed to pulmonary office. Provided fax number [phone].  Home aware Dr. Luis Balbuena is not in the office today. Will await fax.

## 2021-10-04 NOTE — TELEPHONE ENCOUNTER
Kamilah [facility] calling to confirm receipt of death certificate faxed early this morning. Please call at [phone],GIRISH.    10/01

## 2021-10-05 NOTE — TELEPHONE ENCOUNTER
Spoke with Donte Car at [facility]. Informed her Dr. Antwon Martinez will be back in the office tomorrow to sign death certificate. Donte Car verbalized understanding.

## 2021-11-02 NOTE — DISCHARGE SUMMARY
[facility] - [facility]    Discharge Summary    Ann Marie Townsend Patient Status:  Inpatient    1970 MRN [id number]   Location [facility] 2W/SW Attending No att. providers found   [address] Day # 4 PCP Arthur Cortes MD     Date of Admission: indicating anoxic brain injury       patient remain intubated on mechanical ventilation  Completed hypothermic protocol  LVEF 25% and emergent cath showed patent coronaries  Unfortunately patient remain completely comatose with no gag reflex and no corneal Historical    Norgestim-Eth Estrad Triphasic (TRI-LO-SPRINTEC) 0.18/0.215/0.25 MG-25 MCG Oral Tab  Take 1 tablet by mouth daily. , Normal, Disp-84 tablet, R-0    ergocalciferol 1.25 MG (16841 UT) Oral Cap  Take 50,000 Units by mouth once a week., Historical

## (undated) NOTE — LETTER
John Rosa [address]  [facility] [address], [place], [place]  [address]  INFORMED CONSENT FOR TRANSFUSION OF BLOOD OR BLOOD PRODUCTS  My physician has informed me of the nature, purpose, benefits and risks of transfusion for blood and blood components that ______________________________________________  (Signature of Patient)                                                            (Responsible party in case of Minor,

## (undated) NOTE — LETTER
10/26/2017   To Whom It May Concern:  Juliette Moser is currently under my medical care .   Current diagnosis   Postural orthostatic tachycardia syndrome, Raynaud's syndrome, anxiety disorder, depressive disorder, obsessive-compulsive disorder, ADHD, E commute. She may use a regular workstation without accommodations for short periods of time like mentioned here. During these times she will need the same movement breaks as well as resuscitation kits.   It is necessary for her to leave her house, to manage h

## (undated) NOTE — MR AVS SNAPSHOT
[address]  [address] [facility]  [phone]               Thank you for choosing us for your health care visit with Christiano Ge MD.  We are glad to serve you and happy to provide you with this summ [facility], [facility], [address]  ([address])    [address]   [phone]              Allergies as of Feb 07, 2017     Adenosine     Other reaction(s): Respiratory Distress  Paralysis    Unstable vital si MULTIVITAMIN & MINERAL OR   Take  by mouth. NON FORMULARY   Potassium, magnesium, and sodium tablet which pt adds to water. sodium chloride 1 g Tabs   Take 3 g by mouth once.  Taking 1 tab BID.           tretinoin 0.025 % Crea   Externa

## (undated) NOTE — LETTER
3/29/2019              Dannielle Mistry        [address] [facility]         Dear Candelaria Roger records indicate that a lab test ordered for you by Whitney Barrow MD  has not been done.   If you have, in fact, already com

## (undated) NOTE — MR AVS SNAPSHOT
Casimiro Mijares [address] [address]  [phone]  [phone]               Thank you for choosing us for your health care visit with Madeleine Hill PT.   We are glad to serve you and happy to provide you You can access your MyChart to more actively manage your health care and view more details from this visit by going to [email or web address].   If you've recently had a stay at the Hospital you can access your discharge instructions in [address] by katty

## (undated) NOTE — LETTER
7/8/2020              Shelley Capps        [address]        [facility] [address]         Dear Mark Black,    After your last pap, Dr. Azar Oliva MD recommended that you have a repeat pap  done in 6 months.   The repeat pap was due on Ju

## (undated) NOTE — MR AVS SNAPSHOT
[address] [address]  [phone]  [phone]               Thank you for choosing us for your health care visit with Madeleine Cui PT.   We are glad to serve you and happy to provide you Please arrive at your scheduled appointment time. Wear comfortable, loose fitting clothing.             Feb 09, 2018  9:20 AM   Physical - Established with Holley Corona MD   [facility] [place], [facility], [address] [place] [address]  ([facility])    [address] [place] [address]

## (undated) NOTE — MR AVS SNAPSHOT
[address]im[address] M[address] [address]  [phone]  [phone]               Thank you for choosing us for your health care visit with Madeleine Holbrook PT.   We are glad to serve you and happy to provide you You can access your MyChart to more actively manage your health care and view more details from this visit by going to [email or web address].   If you've recently had a stay at the Hospital you can access your discharge instructions in [address] by katty

## (undated) NOTE — MR AVS SNAPSHOT
[facility] [place] [address] [place]  [id number] [phone]               Thank you for choosing us for your health care visit with Areli Godfrey MD.  We are glad to serve you and happy to provide you with this summary of yo Return in about 6 weeks (around 4/3/2017), or if symptoms worsen or fail to improve.       Your Appointments     Feb 21, 2017  2:45 PM   [place] [facility] Visit By Therapist with Loyda Watkins, [address] [place] [address] Take 10 mg by mouth as needed. Commonly known as:  cyclobenzaprine           DULoxetine HCl 60 MG Cpep   Take 120 mg by mouth daily. What changed:  Another medication with the same name was removed.  Continue taking this medication, and follow the direc Visits < Visit Summaries. MyChart questions? Call [phone] for help. MyChart is NOT to be used for urgent needs. For medical emergencies, dial 911.         Educational Information     Your blood pressure indicates you may be at-risk for Hypertens

## (undated) NOTE — LETTER
[address], [place]  Authorization for Invasive Procedures  1.  I hereby authorize Dr. Jessica Wu , my physician and whomever may be designated as the doctor's assistant, to perform the following operation and/or procedure:  Bere madden producst. The following are some, but not all, of the potential risks that can occur: fever and allergic reactions, hemolytic reactions, transmission of disease such as hepatitis, AIDS, cytomegalovirus (CMV), and flluid overload.  In the event that I wish t sedation/analgesia as may be necessary or desirable in the judgment of my physician.      Signature of Patient:  ________________________________________________ Date: _________Time: _________    Responsible person in case of minor or unconscious: _________

## (undated) NOTE — MR AVS SNAPSHOT
[facility] [address] [address]  [phone]  [phone]               Thank you for choosing us for your health care visit with Madeleine Deleon PT.   We are glad to serve you and happy to provide you Please arrive at your scheduled appointment time. Wear comfortable, loose fitting clothing.             Feb 07, 2017 10:00 AM   Physical - Established with Donaldo Posey MD   [facility] [place], [facility], [address], [place] [address] [address])    [address] * Notice: This list has 2 medication(s) that are the same as other medications prescribed for you. Read the directions carefully, and ask your doctor or other care provider to review them with you.             MyChart     Visit [facility]  You can access you

## (undated) NOTE — MR AVS SNAPSHOT
Casim[address] M[address]ares [address] [address]  [phone]  [phone]               Thank you for choosing us for your health care visit with Madeleine Hernandez PT.   We are glad to serve you and happy to provide you Please arrive at your scheduled appointment time. Wear comfortable, loose fitting clothing.             Apr 25, 2017  2:45 PM   [place] [facility] Visit By Therapist with Caitlin Barajas [address] [facility] -

## (undated) NOTE — LETTER
5/28/2020              Andrew Begumr        [address]         Dear Juan Valenzuela,    After your last pap, Dr. Osmar Nicholson MD recommended that you have a repeat pap  done in 6 months.   The repeat pap is due in [place]

## (undated) NOTE — MR AVS SNAPSHOT
After Visit Summary   1/6/2020    Mallika Goode    MRN: [id number]           Visit Information     Date & Time  1/6/2020  1:40 PM Provider  Malgorzata Chahal MD Department  [address] [facility], [address] [place] [address] Dept.  Phone  [phone] Return in about 6 months (around 7/6/2020).      We Ordered the Following     Normal Orders This Visit    HPV HIGH RISK , THIN PREP COLLECTION [TBQ1136 CUSTOM]     THINPREP PAP SMEAR B [[id number] CUSTOM]     THINPREP PAP SMEAR ONLY [RHM9451 CUSTOM]     Future Treatment for mild illness or injury that does not require immediate attention.      Average cost  $70*      [facility] [address]  Monday – Friday  8:00 am – 8:00 pm   Saturday – Sunday  8:00 am – 4:00 pm    WALK-IN [facility]

## (undated) NOTE — LETTER
[address], [place]  Authorization for Invasive Procedures  1.  I hereby authorize Dr. Amelia Morfin , my physician and whomever may be designated as the doctor's assistant, to perform the following operation and/or procedure:  Bere madden following are some, but not all, of the potential risks that can occur: fever and allergic reactions, hemolytic reactions, transmission of disease such as hepatitis, AIDS, cytomegalovirus (CMV), and flluid overload.  In the event that I wish to have autolog as may be necessary or desirable in the judgment of my physician.      Signature of Patient:  ________________________________________________ Date: _________Time: _________    Responsible person in case of minor or unconscious: ____________________________

## (undated) NOTE — MR AVS SNAPSHOT
[address] [address]  [phone]  [phone]               Thank you for choosing us for your health care visit with Madeleine Delarosa PT.   We are glad to serve you and happy to provide you Please arrive at your scheduled appointment time. Wear comfortable, loose fitting clothing.             Jan 26, 2017  2:45 PM   [place] [facility] Visit By Therapist with Wade Messina, [address] [facility] - External application to the face and chest once daily. Commonly known as:  RETIN-A           TRUFORM STOCKINGS 10-20MMHG Misc   WAIST HIGH-4 PAIRS           * Notice:   This list has 2 medication(s) that are the same as other medications prescribed for yo

## (undated) NOTE — LETTER
John Rosa [address], [place], [place]  [address]  INFORMED CONSENT FOR TRANSFUSION OF BLOOD OR BLOOD PRODUCTS  My physician has informed me of the nature, purpose, benefits and risks of transfusion for blood and blood components that ______________________________________________  (Signature of Patient)                                                            (Responsible party in case of Minor,

## (undated) NOTE — LETTER
10/16/2019              Korina Forbes        [address] [facility] [place]         Dear Cami Billy,     Just a reminder that you are due for your follow-up appointment for a pap.   Please call our office at [phone] to sche